# Patient Record
Sex: FEMALE | Race: WHITE | NOT HISPANIC OR LATINO | Employment: OTHER | URBAN - METROPOLITAN AREA
[De-identification: names, ages, dates, MRNs, and addresses within clinical notes are randomized per-mention and may not be internally consistent; named-entity substitution may affect disease eponyms.]

---

## 2023-10-16 ENCOUNTER — HOSPITAL ENCOUNTER (EMERGENCY)
Facility: CLINIC | Age: 88
Discharge: HOME | End: 2023-10-16
Attending: EMERGENCY MEDICINE | Admitting: EMERGENCY MEDICINE
Payer: MEDICARE

## 2023-10-16 ENCOUNTER — APPOINTMENT (OUTPATIENT)
Dept: CT IMAGING | Facility: CLINIC | Age: 88
End: 2023-10-16
Attending: EMERGENCY MEDICINE
Payer: MEDICARE

## 2023-10-16 VITALS
SYSTOLIC BLOOD PRESSURE: 153 MMHG | TEMPERATURE: 97.2 F | OXYGEN SATURATION: 98 % | RESPIRATION RATE: 16 BRPM | DIASTOLIC BLOOD PRESSURE: 75 MMHG | WEIGHT: 97 LBS | HEIGHT: 62 IN | BODY MASS INDEX: 17.85 KG/M2 | HEART RATE: 83 BPM

## 2023-10-16 DIAGNOSIS — S32.10XA CLOSED FRACTURE OF SACRUM, UNSPECIFIED PORTION OF SACRUM, INITIAL ENCOUNTER (H): ICD-10-CM

## 2023-10-16 DIAGNOSIS — S09.90XA CLOSED HEAD INJURY, INITIAL ENCOUNTER: ICD-10-CM
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
## 2023-10-16 DIAGNOSIS — S39.012A STRAIN OF LUMBAR REGION, INITIAL ENCOUNTER: ICD-10-CM

## 2023-10-16 DIAGNOSIS — S16.1XXA CERVICAL STRAIN, INITIAL ENCOUNTER: ICD-10-CM

## 2023-10-16 PROCEDURE — 99285 EMERGENCY DEPT VISIT HI MDM: CPT | Mod: 25

## 2023-10-16 PROCEDURE — 70450 CT HEAD/BRAIN W/O DYE: CPT | Mod: ME

## 2023-10-16 PROCEDURE — G1010 CDSM STANSON: HCPCS

## 2023-10-16 PROCEDURE — 72131 CT LUMBAR SPINE W/O DYE: CPT | Mod: ME

## 2023-10-16 PROCEDURE — 72192 CT PELVIS W/O DYE: CPT | Mod: MG

## 2023-10-16 PROCEDURE — 27197 CLSD TX PELVIC RING FX: CPT

## 2023-10-16 RX ORDER — OXYCODONE HYDROCHLORIDE 5 MG/1
5 TABLET ORAL EVERY 6 HOURS PRN
Qty: 12 TABLET | Refills: 0 | Status: SHIPPED | OUTPATIENT
Start: 2023-10-16 | End: 2023-10-16

## 2023-10-16 RX ORDER — OXYCODONE HYDROCHLORIDE 5 MG/1
5 TABLET ORAL EVERY 6 HOURS PRN
Qty: 12 TABLET | Refills: 0 | Status: SHIPPED | OUTPATIENT
Start: 2023-10-16 | End: 2024-04-22

## 2023-10-17 NOTE — ED NOTES
Road tested pt per md, road test went beautifully. Pt was able to get up and walk perfectly find with no stumbles. Pt did express some pain while walking but walked swiftly.

## 2023-10-17 NOTE — ED TRIAGE NOTES
Pt presents for complaint of a fall that occurred approx 2 hours ago. Pt states she was using her walker and attempted to turn when her shoes caught causing her to fall to the left side. Pt states she did hit her head but denies LOC. Denies use of blood thinning medications. Pt complains of neck pain, c-collar applied for precautions. Pt additionally complains of mid low back pain specifically between the sacrum and lumbar spine. ABC intact, A&Ox4.     Triage Assessment (Adult)       Row Name 10/16/23 2008          Triage Assessment    Airway WDL WDL        Respiratory WDL    Respiratory WDL WDL        Skin Circulation/Temperature WDL    Skin Circulation/Temperature WDL WDL        Cardiac WDL    Cardiac WDL WDL        Peripheral/Neurovascular WDL    Peripheral Neurovascular WDL WDL        Cognitive/Neuro/Behavioral WDL    Cognitive/Neuro/Behavioral WDL WDL

## 2023-10-17 NOTE — ED PROVIDER NOTES
History     Chief Complaint:  Fall       HPI   Michelle Thomas is a 89 year old female who presents with family from assisted living who had a mechanical fall while using her walker.  She was trying to turn and lost her footing and fell backwards onto her bottom and then onto her head.  She has pain in the back of her head neck low back and into her pelvis.  She notes she does have some chronic pelvis pain.  She is not anticoagulated and denies any recent illnesses.      Independent Historian:    Patient and family at the bedside    Review of External Notes:  Chart review    Medications:    oxyCODONE (ROXICODONE) 5 MG tablet  acetaminophen 500 MG CAPS  albuterol (PROAIR HFA, PROVENTIL HFA, VENTOLIN HFA) 108 (90 BASE) MCG/ACT inhaler  amLODIPine (NORVASC) 5 MG tablet  atorvastatin (LIPITOR) 40 MG tablet  cetirizine (ZYRTEC) 10 MG tablet  Cholecalciferol (VITAMIN D3 PO)  fluticasone (FLOVENT HFA) 110 MCG/ACT inhaler  gabapentin 8 % GEL  lidocaine (LIDODERM) 5 % patch  Losartan Potassium (COZAAR PO)  multivitamin, therapeutic with minerals (MULTI-VITAMIN) TABS  Omeprazole (PRILOSEC PO)        Past Medical History:    Past Medical History:   Diagnosis Date    Arthritis     Bladder cystocele     Complicated UTI (urinary tract infection)     CRD (chronic renal disease), stage III     Diffuse brain atrophy     H/O fall     Hiatal hernia     Hypertension     Irregular heart beat     Osteoporosis     Other chronic pain     Physical deconditioning     Pure hypercholesterolemia     Recurrent sinusitis     Renal disease     TIA (transient ischemic attack)     Uncomplicated asthma     Wedge compression fracture of T10 vertebra (H)        Past Surgical History:    Past Surgical History:   Procedure Laterality Date    BIOPSY      biopsy    BRONCHOSCOPY      COLONOSCOPY      EXTRACORPOREAL SHOCK WAVE LITHOTRIPSY (ESWL) Right 8/2/2016    Procedure: EXTRACORPOREAL SHOCK WAVE LITHOTRIPSY (ESWL);  Surgeon: Moody Dunaway,  "MD;  Location:  OR    EXTRACORPOREAL SHOCK WAVE LITHOTRIPSY, CYSTOSCOPY, INSERT STENT URETER(S), COMBINED Right 6/28/2016    Procedure: COMBINED EXTRACORPOREAL SHOCK WAVE LITHOTRIPSY, CYSTOSCOPY, INSERT STENT URETER(S);  Surgeon: Moody Dunaway MD;  Location:  OR    EYE SURGERY Bilateral     cataract surgery    GYN SURGERY      D and C    GYN SURGERY      Hysterectomy with bladder lift    OPEN REDUCTION INTERNAL FIXATION WRIST Right 6/14/2016    Procedure: OPEN REDUCTION INTERNAL FIXATION WRIST;  Surgeon: Judah Cortes MD;  Location:  OR          Physical Exam   Patient Vitals for the past 24 hrs:   BP Temp Pulse Resp SpO2 Height Weight   10/16/23 2200 (!) 153/75 -- 83 -- 98 % -- --   10/16/23 2145 (!) 149/77 -- 80 -- 98 % -- --   10/16/23 2130 (!) 167/74 -- 77 -- 97 % -- --   10/16/23 2007 (!) 148/59 97.2  F (36.2  C) 81 16 98 % 1.575 m (5' 2\") 44 kg (97 lb)        Physical Exam  GENERAL: well developed, pleasant  HEAD: atraumatic  EYES: pupils reactive, extraocular muscles intact, conjunctivae normal  ENT:  mucus membranes moist  NECK:  trachea midline, normal range of motion  RESPIRATORY: no tachypnea, breath sounds clear to auscultation   CVS: normal S1/S2, no murmurs, intact distal pulses  ABDOMEN: soft, nontender, nondistention  MUSCULOSKELETAL: Mild generalized neck pain no focal tenderness, low back pain as well as sacral pain able to take the hips through range of motion  SKIN: warm and dry, no acute rashes or ulceration  NEURO: GCS 15, cranial nerves intact, alert and oriented x3  PSYCH:  Mood/affect normal      Emergency Department Course       Imaging:  Lumbar spine CT w/o contrast   Final Result   IMPRESSION:   1.  No acute lumbar spine fracture.   2.  Severe right hydroureteronephrosis with numerous small renal calculi.      CT Pelvis Bone wo Contrast   Final Result   IMPRESSION: Acute to subacute minimally displaced fracture along the distal sacrum and sacrococcygeal junction.    "      Cervical spine CT w/o contrast   Final Result   IMPRESSION:   HEAD CT:   1.  No acute intracranial process.      CERVICAL SPINE CT:   1.  No acute cervical spine fracture.      Head CT w/o contrast   Final Result   IMPRESSION:   HEAD CT:   1.  No acute intracranial process.      CERVICAL SPINE CT:   1.  No acute cervical spine fracture.        Report per radiology    Laboratory:  Labs Ordered and Resulted from Time of ED Arrival to Time of ED Departure - No data to display     Procedures       Emergency Department Course & Assessments:             Interventions:  Medications - No data to display     Assessments:      Independent Interpretation (X-rays, CTs, rhythm strip):  None    Consultations/Discussion of Management or Tests:  None       Social Determinants of Health affecting care:  N/A     Disposition:  The patient was discharged to home.     Impression & Plan    CMS Diagnoses: None          Medical Decision Making:  Patient presents with mechanical fall.  Imaging is as above but acute versus subacute fracture of the lower sacrum coccyx region.  Patient been able to ambulate and was ambulated here and tolerated this well.  She feels comfortable going home.  She would like something for pain for at home besides Tylenol.  She has tolerated oxycodone in the past.    Critical Care time:  was 0 minutes for this patient excluding procedures.    Diagnosis:    ICD-10-CM    1. Fall, initial encounter  W19.XXXA       2. Closed head injury, initial encounter  S09.90XA       3. Cervical strain, initial encounter  S16.1XXA       4. Strain of lumbar region, initial encounter  S39.012A       5. Closed fracture of sacrum, unspecified portion of sacrum, initial encounter (H)  S32.10XA     acute vs chronic           Discharge Medications:  Discharge Medication List as of 10/16/2023 10:28 PM             Beny López MD  10/16/2023   No att. providers found              Beny López MD  10/16/23 0882

## 2024-04-22 ENCOUNTER — APPOINTMENT (OUTPATIENT)
Dept: CT IMAGING | Facility: CLINIC | Age: 89
DRG: 689 | End: 2024-04-22
Attending: PHYSICIAN ASSISTANT
Payer: MEDICARE

## 2024-04-22 ENCOUNTER — HOSPITAL ENCOUNTER (INPATIENT)
Facility: CLINIC | Age: 89
LOS: 2 days | Discharge: HOME HEALTH | DRG: 689 | End: 2024-04-25
Attending: PHYSICIAN ASSISTANT | Admitting: HOSPITALIST
Payer: MEDICARE

## 2024-04-22 DIAGNOSIS — E83.52 HYPERCALCEMIA: ICD-10-CM

## 2024-04-22 DIAGNOSIS — R91.8 INFILTRATE OF LUNG PRESENT ON IMAGING OF CHEST: ICD-10-CM

## 2024-04-22 DIAGNOSIS — G93.40 ACUTE ENCEPHALOPATHY: ICD-10-CM
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
## 2024-04-22 DIAGNOSIS — R29.6 UNWITNESSED FALL: ICD-10-CM

## 2024-04-22 LAB
ALBUMIN SERPL BCG-MCNC: 4.2 G/DL (ref 3.5–5.2)
ALBUMIN UR-MCNC: NEGATIVE MG/DL
ALP SERPL-CCNC: 160 U/L (ref 40–150)
ALT SERPL W P-5'-P-CCNC: 21 U/L (ref 0–50)
AMORPH CRY #/AREA URNS HPF: ABNORMAL /HPF
ANION GAP SERPL CALCULATED.3IONS-SCNC: 12 MMOL/L (ref 7–15)
APPEARANCE UR: ABNORMAL
AST SERPL W P-5'-P-CCNC: 26 U/L (ref 0–45)
ATRIAL RATE - MUSE: 76 BPM
BACTERIA #/AREA URNS HPF: ABNORMAL /HPF
BASOPHILS # BLD AUTO: 0 10E3/UL (ref 0–0.2)
BASOPHILS NFR BLD AUTO: 1 %
BILIRUB SERPL-MCNC: 0.6 MG/DL
BILIRUB UR QL STRIP: NEGATIVE
BUN SERPL-MCNC: 31.1 MG/DL (ref 8–23)
CA-I BLD-MCNC: 6.5 MG/DL (ref 4.4–5.2)
CALCIUM SERPL-MCNC: 13.3 MG/DL (ref 8.2–9.6)
CHLORIDE SERPL-SCNC: 105 MMOL/L (ref 98–107)
CK SERPL-CCNC: 61 U/L (ref 26–192)
COLOR UR AUTO: ABNORMAL
CREAT SERPL-MCNC: 1.24 MG/DL (ref 0.51–0.95)
DEPRECATED HCO3 PLAS-SCNC: 28 MMOL/L (ref 22–29)
DIASTOLIC BLOOD PRESSURE - MUSE: NORMAL MMHG
EGFRCR SERPLBLD CKD-EPI 2021: 39 ML/MIN/1.73M2
EOSINOPHIL # BLD AUTO: 0 10E3/UL (ref 0–0.7)
EOSINOPHIL NFR BLD AUTO: 1 %
ERYTHROCYTE [DISTWIDTH] IN BLOOD BY AUTOMATED COUNT: 14.1 % (ref 10–15)
GLUCOSE SERPL-MCNC: 104 MG/DL (ref 70–99)
GLUCOSE UR STRIP-MCNC: NEGATIVE MG/DL
HCT VFR BLD AUTO: 35.9 % (ref 35–47)
HGB BLD-MCNC: 11.9 G/DL (ref 11.7–15.7)
HGB UR QL STRIP: NEGATIVE
IMM GRANULOCYTES # BLD: 0 10E3/UL
IMM GRANULOCYTES NFR BLD: 0 %
INTERPRETATION ECG - MUSE: NORMAL
KETONES UR STRIP-MCNC: NEGATIVE MG/DL
LEUKOCYTE ESTERASE UR QL STRIP: NEGATIVE
LYMPHOCYTES # BLD AUTO: 0.8 10E3/UL (ref 0.8–5.3)
LYMPHOCYTES NFR BLD AUTO: 11 %
MCH RBC QN AUTO: 32.9 PG (ref 26.5–33)
MCHC RBC AUTO-ENTMCNC: 33.1 G/DL (ref 31.5–36.5)
MCV RBC AUTO: 99 FL (ref 78–100)
MONOCYTES # BLD AUTO: 0.7 10E3/UL (ref 0–1.3)
MONOCYTES NFR BLD AUTO: 9 %
NEUTROPHILS # BLD AUTO: 5.6 10E3/UL (ref 1.6–8.3)
NEUTROPHILS NFR BLD AUTO: 78 %
NITRATE UR QL: NEGATIVE
NRBC # BLD AUTO: 0 10E3/UL
NRBC BLD AUTO-RTO: 0 /100
P AXIS - MUSE: 72 DEGREES
PH UR STRIP: 7.5 [PH] (ref 5–7)
PLATELET # BLD AUTO: 241 10E3/UL (ref 150–450)
POTASSIUM SERPL-SCNC: 3.8 MMOL/L (ref 3.4–5.3)
PR INTERVAL - MUSE: 178 MS
PROCALCITONIN SERPL IA-MCNC: 0.13 NG/ML
PROT SERPL-MCNC: 7 G/DL (ref 6.4–8.3)
QRS DURATION - MUSE: 78 MS
QT - MUSE: 366 MS
QTC - MUSE: 411 MS
R AXIS - MUSE: 21 DEGREES
RBC # BLD AUTO: 3.62 10E6/UL (ref 3.8–5.2)
RBC URINE: 2 /HPF
SODIUM SERPL-SCNC: 145 MMOL/L (ref 135–145)
SP GR UR STRIP: 1.01 (ref 1–1.03)
SYSTOLIC BLOOD PRESSURE - MUSE: NORMAL MMHG
T AXIS - MUSE: 18 DEGREES
UROBILINOGEN UR STRIP-MCNC: NORMAL MG/DL
VENTRICULAR RATE- MUSE: 76 BPM
WBC # BLD AUTO: 7.2 10E3/UL (ref 4–11)
WBC URINE: 6 /HPF

## 2024-04-22 PROCEDURE — G0378 HOSPITAL OBSERVATION PER HR: HCPCS

## 2024-04-22 PROCEDURE — 93005 ELECTROCARDIOGRAM TRACING: CPT

## 2024-04-22 PROCEDURE — 99222 1ST HOSP IP/OBS MODERATE 55: CPT | Performed by: PHYSICIAN ASSISTANT

## 2024-04-22 PROCEDURE — 250N000011 HC RX IP 250 OP 636: Performed by: PHYSICIAN ASSISTANT

## 2024-04-22 PROCEDURE — 87086 URINE CULTURE/COLONY COUNT: CPT | Performed by: PHYSICIAN ASSISTANT

## 2024-04-22 PROCEDURE — 250N000013 HC RX MED GY IP 250 OP 250 PS 637: Performed by: PHYSICIAN ASSISTANT

## 2024-04-22 PROCEDURE — 258N000003 HC RX IP 258 OP 636: Performed by: PHYSICIAN ASSISTANT

## 2024-04-22 PROCEDURE — 84145 PROCALCITONIN (PCT): CPT | Performed by: PHYSICIAN ASSISTANT

## 2024-04-22 PROCEDURE — 80053 COMPREHEN METABOLIC PANEL: CPT | Performed by: PHYSICIAN ASSISTANT

## 2024-04-22 PROCEDURE — 99285 EMERGENCY DEPT VISIT HI MDM: CPT | Mod: 25

## 2024-04-22 PROCEDURE — 70450 CT HEAD/BRAIN W/O DYE: CPT | Mod: MA

## 2024-04-22 PROCEDURE — 81001 URINALYSIS AUTO W/SCOPE: CPT | Performed by: PHYSICIAN ASSISTANT

## 2024-04-22 PROCEDURE — 82330 ASSAY OF CALCIUM: CPT | Performed by: PHYSICIAN ASSISTANT

## 2024-04-22 PROCEDURE — 36415 COLL VENOUS BLD VENIPUNCTURE: CPT | Performed by: PHYSICIAN ASSISTANT

## 2024-04-22 PROCEDURE — 72131 CT LUMBAR SPINE W/O DYE: CPT | Mod: MA

## 2024-04-22 PROCEDURE — 71260 CT THORAX DX C+: CPT | Mod: MA

## 2024-04-22 PROCEDURE — 82550 ASSAY OF CK (CPK): CPT | Performed by: PHYSICIAN ASSISTANT

## 2024-04-22 PROCEDURE — 72125 CT NECK SPINE W/O DYE: CPT | Mod: MA

## 2024-04-22 PROCEDURE — 85025 COMPLETE CBC W/AUTO DIFF WBC: CPT | Performed by: PHYSICIAN ASSISTANT

## 2024-04-22 RX ORDER — PANTOPRAZOLE SODIUM 40 MG/1
40 TABLET, DELAYED RELEASE ORAL
Status: DISCONTINUED | OUTPATIENT
Start: 2024-04-23 | End: 2024-04-25 | Stop reason: HOSPADM

## 2024-04-22 RX ORDER — DILTIAZEM HYDROCHLORIDE 240 MG/1
240 CAPSULE, EXTENDED RELEASE ORAL DAILY
COMMUNITY

## 2024-04-22 RX ORDER — LIDOCAINE 40 MG/G
CREAM TOPICAL
Status: DISCONTINUED | OUTPATIENT
Start: 2024-04-22 | End: 2024-04-25 | Stop reason: HOSPADM

## 2024-04-22 RX ORDER — ONDANSETRON 2 MG/ML
4 INJECTION INTRAMUSCULAR; INTRAVENOUS EVERY 6 HOURS PRN
Status: DISCONTINUED | OUTPATIENT
Start: 2024-04-22 | End: 2024-04-25 | Stop reason: HOSPADM

## 2024-04-22 RX ORDER — ONDANSETRON 4 MG/1
4 TABLET, ORALLY DISINTEGRATING ORAL EVERY 6 HOURS PRN
Status: DISCONTINUED | OUTPATIENT
Start: 2024-04-22 | End: 2024-04-25 | Stop reason: HOSPADM

## 2024-04-22 RX ORDER — DILTIAZEM HYDROCHLORIDE 240 MG/1
240 CAPSULE, COATED, EXTENDED RELEASE ORAL DAILY
Status: DISCONTINUED | OUTPATIENT
Start: 2024-04-22 | End: 2024-04-25 | Stop reason: HOSPADM

## 2024-04-22 RX ORDER — ACETAMINOPHEN 500 MG
1000 TABLET ORAL 3 TIMES DAILY
Status: DISCONTINUED | OUTPATIENT
Start: 2024-04-22 | End: 2024-04-25 | Stop reason: HOSPADM

## 2024-04-22 RX ORDER — OMEPRAZOLE 10 MG/1
10 CAPSULE, DELAYED RELEASE ORAL DAILY
COMMUNITY

## 2024-04-22 RX ORDER — AMOXICILLIN 250 MG
2 CAPSULE ORAL 2 TIMES DAILY PRN
Status: DISCONTINUED | OUTPATIENT
Start: 2024-04-22 | End: 2024-04-25 | Stop reason: HOSPADM

## 2024-04-22 RX ORDER — IOPAMIDOL 755 MG/ML
50 INJECTION, SOLUTION INTRAVASCULAR ONCE
Status: COMPLETED | OUTPATIENT
Start: 2024-04-22 | End: 2024-04-22

## 2024-04-22 RX ORDER — SODIUM CHLORIDE 9 MG/ML
INJECTION, SOLUTION INTRAVENOUS CONTINUOUS
Status: ACTIVE | OUTPATIENT
Start: 2024-04-22 | End: 2024-04-23

## 2024-04-22 RX ORDER — ACETAMINOPHEN 500 MG
1000 TABLET ORAL 3 TIMES DAILY
COMMUNITY

## 2024-04-22 RX ORDER — AMOXICILLIN 250 MG
1 CAPSULE ORAL 2 TIMES DAILY PRN
Status: DISCONTINUED | OUTPATIENT
Start: 2024-04-22 | End: 2024-04-25 | Stop reason: HOSPADM

## 2024-04-22 RX ORDER — POLYETHYLENE GLYCOL 3350 17 G/17G
17 POWDER, FOR SOLUTION ORAL 2 TIMES DAILY PRN
Status: DISCONTINUED | OUTPATIENT
Start: 2024-04-22 | End: 2024-04-25 | Stop reason: HOSPADM

## 2024-04-22 RX ADMIN — SODIUM CHLORIDE 1000 ML: 9 INJECTION, SOLUTION INTRAVENOUS at 11:17

## 2024-04-22 RX ADMIN — SODIUM CHLORIDE: 9 INJECTION, SOLUTION INTRAVENOUS at 19:16

## 2024-04-22 RX ADMIN — ACETAMINOPHEN 1000 MG: 500 TABLET, FILM COATED ORAL at 19:16

## 2024-04-22 RX ADMIN — IOPAMIDOL 50 ML: 755 INJECTION, SOLUTION INTRAVENOUS at 12:33

## 2024-04-22 RX ADMIN — DILTIAZEM HYDROCHLORIDE 240 MG: 240 CAPSULE, COATED, EXTENDED RELEASE ORAL at 18:14

## 2024-04-22 ASSESSMENT — ACTIVITIES OF DAILY LIVING (ADL)
ADLS_ACUITY_SCORE: 42
ADLS_ACUITY_SCORE: 36
DEPENDENT_IADLS:: CLEANING;COOKING;MEAL PREPARATION;MEDICATION MANAGEMENT
ADLS_ACUITY_SCORE: 42
ADLS_ACUITY_SCORE: 36
ADLS_ACUITY_SCORE: 42
ADLS_ACUITY_SCORE: 36
ADLS_ACUITY_SCORE: 42
ADLS_ACUITY_SCORE: 36

## 2024-04-22 ASSESSMENT — COLUMBIA-SUICIDE SEVERITY RATING SCALE - C-SSRS
2. HAVE YOU ACTUALLY HAD ANY THOUGHTS OF KILLING YOURSELF IN THE PAST MONTH?: NO
1. IN THE PAST MONTH, HAVE YOU WISHED YOU WERE DEAD OR WISHED YOU COULD GO TO SLEEP AND NOT WAKE UP?: NO
6. HAVE YOU EVER DONE ANYTHING, STARTED TO DO ANYTHING, OR PREPARED TO DO ANYTHING TO END YOUR LIFE?: NO

## 2024-04-22 NOTE — ED NOTES
Bed: ED40  Expected date: 4/22/24  Expected time: 10:32 AM  Means of arrival:   Comments:  Bhaskar Franco

## 2024-04-22 NOTE — ED NOTES
[facility]  ED Nurse Handoff Report    ED Chief complaint: Fall  . ED Diagnosis:   Final diagnoses:   None       Allergies:   Allergies   Allergen Reactions    Dust Mites     Latex Itching    Pollen Extract     Ampicillin Rash    Cefzil [Cefprozil] Rash     Red, itchy  Note: received Ancef 10/6/09 w/o problems    Doxycycline Nausea    Hydrocodone Other (See Comments)     Confusion    Oxycodone Itching      Not a true allergy    Penicillins Rash    Septra [Sulfamethoxazole-Trimethoprim] Rash       Code Status: DNR    Activity level - Baseline/Home:  walker.  Activity Level - Current:   assist of 1.   Lift room needed: No.   Bariatric: No   Needed: No   Isolation: No.   Infection: Not Applicable.     Respiratory status: Room air    Vital Signs (within 30 minutes):   Vitals:    04/22/24 1200 04/22/24 1245 04/22/24 1300 04/22/24 1315   BP: (!) 186/92 (!) 177/86 (!) 183/103    Pulse: 77 89 87 83   Resp: 11 14 15 10   Temp:       TempSrc:       SpO2: 99%   94%       Cardiac Rhythm:  ,      Pain level:    Patient confused: Yes.   Patient Falls Risk: patient and family education and activity supervised.   Elimination Status: Has voided     Patient Report - Initial Complaint: fall.   Focused Assessment: Michelle Thomas is a 89 year old female with history of osteoporosis, osteoarthritis, sciatica, hypertension, hyperlipidemia, and chronic kidney disease who presents to the ER from her nursing home via EMS after a fall. The nursing home staff reports the patient had a fall yesterday where EMS responded. The patient didn't go to the hospital though because she stated that she felt fine. This morning the nursing home staff reports that they found the patient on the floor when they were about to give the patient her morning medications. At the time the patient states that she was trying to put her pants but lost her balance and fell. Upon arrival to the ER she endorses lower back pain, a  headache, and abdominal pain. She also told EMS that she felt a bump on her posterior head, but EMS couldn't palpate one.        Abnormal Results:   Labs Ordered and Resulted from Time of ED Arrival to Time of ED Departure   COMPREHENSIVE METABOLIC PANEL - Abnormal       Result Value    Sodium 145      Potassium 3.8      Carbon Dioxide (CO2) 28      Anion Gap 12      Urea Nitrogen 31.1 (*)     Creatinine 1.24 (*)     GFR Estimate 39 (*)     Calcium 13.3 (*)     Chloride 105      Glucose 104 (*)     Alkaline Phosphatase 160 (*)     AST 26      ALT 21      Protein Total 7.0      Albumin 4.2      Bilirubin Total 0.6     ROUTINE UA WITH MICROSCOPIC REFLEX TO CULTURE - Abnormal    Color Urine Straw      Appearance Urine Cloudy (*)     Glucose Urine Negative      Bilirubin Urine Negative      Ketones Urine Negative      Specific Gravity Urine 1.009      Blood Urine Negative      pH Urine 7.5 (*)     Protein Albumin Urine Negative      Urobilinogen Urine Normal      Nitrite Urine Negative      Leukocyte Esterase Urine Negative      Bacteria Urine Few (*)     Amorphous Crystals Urine Few (*)     RBC Urine 2      WBC Urine 6 (*)    CBC WITH PLATELETS AND DIFFERENTIAL - Abnormal    WBC Count 7.2      RBC Count 3.62 (*)     Hemoglobin 11.9      Hematocrit 35.9      MCV 99      MCH 32.9      MCHC 33.1      RDW 14.1      Platelet Count 241      % Neutrophils 78      % Lymphocytes 11      % Monocytes 9      % Eosinophils 1      % Basophils 1      % Immature Granulocytes 0      NRBCs per 100 WBC 0      Absolute Neutrophils 5.6      Absolute Lymphocytes 0.8      Absolute Monocytes 0.7      Absolute Eosinophils 0.0      Absolute Basophils 0.0      Absolute Immature Granulocytes 0.0      Absolute NRBCs 0.0     CK TOTAL - Normal    CK 61     IONIZED CALCIUM   URINE CULTURE        CT Chest/Abdomen/Pelvis w Contrast   Final Result   IMPRESSION:   1.  Age-indeterminate fracture of T8 may be chronic however no   comparison imaging of the  thoracic spine is available. Please   correlate for tenderness in this area and if clinically indicated, a   follow-up thoracic spine MRI can be considered.   2.  Otherwise, no definite acute traumatic injury in the chest,   abdomen and pelvis.   3.  Mild groundglass opacities in the right lower lobe may be   infectious or inflammatory.   4.  Mild gallbladder distention and moderate intrahepatic and extra   hepatic biliary ductal dilatation has progressed since 2016. No   obstructing calculi or masses by CT. Correlate with liver function   tests and outpatient MRCP or ERCP could be considered.   5.  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 A 6 mm   obstructing calculus in the mid right ureter previously measured 12   mm.    6.  Other incidental findings as discussed above.      SIMI SNIDER MD            SYSTEM ID:  [id number]      CT Cervical Spine w/o Contrast   Preliminary Result   IMPRESSION:   1. No acute cervical spine fracture.   2. Anterolisthesis of C4 on C5 and C5 on C6, unchanged.   3. Multilevel degenerative changes, as described.      Head CT w/o contrast   Preliminary Result   IMPRESSION:   1. No CT findings of acute intracranial process.   2. Brain atrophy and presumed chronic small vessel ischemic changes,   as described.      CT Lumbar Spine w/o Contrast    (Results Pending)       Treatments provided: see MAR  Family Comments: at bedside  OBS brochure/video discussed/provided to patient:  Yes  ED Medications:   Medications   sodium chloride 0.9% BOLUS 1,000 mL (1,000 mLs Intravenous $New Bag 4/22/24 1117)   iopamidol (ISOVUE-370) solution 50 mL (50 mLs Intravenous $Given 4/22/24 1233)   sodium chloride (PF) 0.9% PF flush 52 mL (52 mLs Intravenous $Given 4/22/24 1233)       Drips infusing:  No  For the majority of the shift this patient was Green.   Interventions performed were N/A.    Sepsis treatment initiated: No    Cares/treatment/interventions/medications to be completed following  ED care: see inpatient admit orders.     ED Nurse Name: Michelle Robles RN  1:16 PM

## 2024-04-22 NOTE — H&P
[facility]    History and Physical - Hospitalist Service       Date of Admission:  4/22/2024    Assessment & Plan      Michelle Thomas is a 89 year old female with PMHx significant for HTN, hx of SVT, HLP, CKD, asthma, osteoporosis and fairly recent T8 compression fx, who was admitted on 4/22/2024 with acute encephalopathy. She has had 2 falls in the past 2 days.    1. Acute encephalopathy  Family notes increased confusion from baseline since Friday, 4/19. No focal neurological deficits.   Etiology unclear, no obvious infection noted. UA shows negative LE, negative nitrites with 6 WBCs and bacteria. She does report dysuria.   CT abd/pelvis does not show anything distinctly acute, mild ground glass opacity in RLL, mild gallbladder distention with mod intrahepatic and extra hepatic biliary ductal dilation, atrophic R kidney due to long standing obstruction.    CT head, CT cervical and lumbar spine negative for acute process or fx.  CMP stable, Cr likely at baseline at 1.24 (1.0-1.2), BUN mildly elevated at 31, alk phos 160, LFTs otherwise negative. Ca elevated at 13.3, ionized Ca 6.5. Procalcitonin 0.13. CBC unremarkable.   Family does report pt had a poor night of sleep 2 nights ago, report poor po intake at baseline and pt reports constipation. Family reports that she complained of diarrhea on Saturday.  Suspect encephalopathy is multifactorial. Pt endorses dysuria but UA does not support UTI. No abdominal pain, nausea or vomiting. No abd tenderness on exam, low suspicion for biliary obstruction. Sleep deprivation and constipation could contribute. Ca is elevated, pt was recently started on Tymlos injections for osteoporosis.  Opacity noted on CXR, denies cough or SOB, she is not hypoxic or tachycardic to support respiratory infection.   - admit to OBS  - will given gentle IVFs overnight  - hold Tymlos and Ca supplement  - follow urine culture, low threshold to treat for UTI  - PT/ SW  consult  - monitor stool output  - bladder is distended on CT. Monitor PVRs    2. Hypercalcemia  Chronic T8 compression fx  Ca 13.3 with ionized Ca 6.5. pt recently started on daily Tymlos injections for osteoporosis and to help T8 compression fx heal. Also on Vitamin C and D supplementation  - hold supplementation and Tymlos  - asked family to reach out to endocrinologist to inform then of labs and further recommendations.   - continue TID Tylenol   3. HTN  Hx of SVT  - resume home Diltiazem  4. HLP  - hold statin while OBS  5. CKD  Cr 1.24 here, likely his baseline. Baseline appears to fluctuate between 1.0-1.2  6. Asthma    Managed with PRN albuterol inhaler         Diet:  regular diet  DVT Prophylaxis: Pneumatic Compression Devices  Portillo Catheter: Not present  Lines: None     Cardiac Monitoring: None  Code Status:  Full code. Discussed with pt as she was previously DNR. She is adamant that she wants to be full code to make it to 100. Explained likely poor outcome if pt was resuscitated but she stood firm in her wishes. Family would like to honor her wishes.    Clinically Significant Risk Factors Present on Admission           # Hypercalcemia: Highest Ca = 13.3 mg/dL in last 2 days, will monitor as appropriate                        Disposition Plan     Medically Ready for Discharge: Anticipated Tomorrow         The patient's care was discussed with the Bedside Nurse, Patient, Patient's Family, and ED provider, Blaise Qiu PA-c .    Leidy Morales PA-C  Hospitalist Service  [facility]  Securely message with [employer] (more info)  Text page via [facility] Paging/Directory     ______________________________________________________________________    Chief Complaint   Falls and confusion    History is obtained from the patient and patient's daughter     History of Present Illness   Michelle Thomas is a 89 year old female with PMHx significant for HTN, hx of SVT, HLP, CKD, asthma, osteoporosis  and fairly recent T8 compression fx, who presents to the ED after multiple falls.  Per staff at her nursing home, she had a fall yesterday but felt well so did not go to the hospital.  She fell again this morning while try to put on her pants.  She again denies any injury.  The family does note increased confusion since Friday.  The patient does not endorse dysuria and constipation.  The patient's family states that she complained of diarrhea on Saturday.  She otherwise has been feeling well lately.  The family notes that she has poor oral intake at baseline.  Patient denies fever, chills, chest pain, shortness of breath, Sam pain, nausea, or vomiting.  She does have a history of fairly recent T8 compression fracture and she was seen by endocrinology and started on Tymlos injections daily to help her fracture heal.  She is also on a calcium and vitamin D supplement.  She was treated for a UTI at the end of March and was treated with prednisone for sinus congestion in early April.  The daughter states her mental status remained normal while taking these medications.  The family does report that she had a poor night of sleep 2 nights ago.      Past Medical History    Past Medical History:   Diagnosis Date    Arthritis     Back and right knee    Bladder cystocele     Complicated UTI (urinary tract infection)     CRD (chronic renal disease), stage III (H)     Diffuse brain atrophy     H/O fall     Hiatal hernia     Hypertension     Irregular heart beat     Osteoporosis     Other chronic pain     neck and back    Physical deconditioning     Pure hypercholesterolemia     Recurrent sinusitis     Renal disease     Kidney stone    TIA (transient ischemic attack)     Uncomplicated asthma     Wedge compression fracture of T10 vertebra (H)        Past Surgical History   Past Surgical History:   Procedure Laterality Date    BIOPSY      biopsy    BRONCHOSCOPY      COLONOSCOPY      EXTRACORPOREAL SHOCK WAVE LITHOTRIPSY (ESWL)  Right 8/2/2016    Procedure: EXTRACORPOREAL SHOCK WAVE LITHOTRIPSY (ESWL);  Surgeon: Moody Dunaway MD;  Location: [facility] OR    EXTRACORPOREAL SHOCK WAVE LITHOTRIPSY, CYSTOSCOPY, INSERT STENT URETER(S), COMBINED Right 6/28/2016    Procedure: COMBINED EXTRACORPOREAL SHOCK WAVE LITHOTRIPSY, CYSTOSCOPY, INSERT STENT URETER(S);  Surgeon: Moody Dunaway MD;  Location:  OR    EYE SURGERY Bilateral     cataract surgery    GYN SURGERY      D and C    GYN SURGERY      Hysterectomy with bladder lift    OPEN REDUCTION INTERNAL FIXATION WRIST Right 6/14/2016    Procedure: OPEN REDUCTION INTERNAL FIXATION WRIST;  Surgeon: Judah Cortes MD;  Location:  OR       Prior to Admission Medications   Prior to Admission Medications   Prescriptions Last Dose Informant Patient Reported? Taking?   Abaloparatide 3120 MCG/1.56ML SOPN injection 4/21/2024 at HS  Yes Yes   Sig: Inject 80 mcg Subcutaneous at bedtime (80 mcg dose pen injector)   Cholecalciferol (VITAMIN D3 PO) 4/19/2024 at AM  Yes Yes   Sig: Take 2,000 Units by mouth daily    acetaminophen (TYLENOL) 500 MG tablet Unknown  Yes Yes   Sig: Take 1,000 mg by mouth 3 times daily   albuterol (PROAIR HFA, PROVENTIL HFA, VENTOLIN HFA) 108 (90 BASE) MCG/ACT inhaler More than a year  Yes Yes   Sig: Inhale 2 puffs into the lungs every 4 hours as needed Gets refilled once a year, never uses.   atorvastatin (LIPITOR) 40 MG tablet 4/21/2024 at HS  Yes Yes   Sig: Take 40 mg by mouth At Bedtime   calcium carbonate (OS-DEANA) 1500 (600 Ca) MG tablet 4/20/2024 at AM  Yes Yes   Sig: Take 600 mg by mouth daily   diltiazem ER (DILT-XR) 240 MG 24 hr ER beaded capsule Un4/22 Not known if patient took this morning or not. Prior dose was on 4/20/24. known at AM  Yes Yes   Sig: Take 240 mg by mouth daily   multivitamin, therapeutic with minerals (MULTI-VITAMIN) TABS 4/19/2024 at AM  Yes Yes   Sig: Take 1 tablet by mouth daily   omeprazole (PRILOSEC) 10 MG DR capsule 4/20/2024 at AM  Yes  Yes   Sig: Take 10 mg by mouth daily      Facility-Administered Medications: None           Physical Exam   Vital Signs: Temp: 97.4  F (36.3  C) Temp src: Temporal BP: (!) 183/103 Pulse: 83   Resp: 10 SpO2: 94 % O2 Device: None (Room air)    Weight: 0 lbs 0 oz    GENERAL:  Comfortable.  PSYCH: pleasant, oriented, No acute distress.  HEENT:  Atraumatic, normocephalic. Normal conjunctiva, normal hearing, and oropharynx is normal.  NECK:  Supple, no neck vein distention  HEART:  Normal S1, S2 with no murmur, no pericardial rub, gallops or S3 or S4.  LUNGS:  Clear to auscultation, normal Respiratory effort. No wheezing, rales or ronchi.  GI:  Soft, normal bowel sounds. Non-tender, non distended.   EXTREMITIES:  No pedal edema, +2 pulses bilateral and equal.  SKIN:  Dry to touch, No rash, wound or ulcerations.  NEUROLOGIC:  CN 2-12 intact, BL 5/5 symmetric upper and lower extremity strength, sensation is intact with no focal deficits.      Medical Decision Making       65 MINUTES SPENT BY ME on the date of service doing chart review, history, exam, documentation & further activities per the note.      Data     I have personally reviewed the following data over the past 24 hrs:    7.2  \   11.9   / 241     145 105 31.1 (H) /  104 (H)   3.8 28 1.24 (H) \     ALT: 21 AST: 26 AP: 160 (H) TBILI: 0.6   ALB: 4.2 TOT PROTEIN: 7.0 LIPASE: N/A     Procal: 0.13 CRP: N/A Lactic Acid: N/A         Imaging results reviewed over the past 24 hrs:   Recent Results (from the past 24 hour(s))   Head CT w/o contrast    Narrative    CT SCAN OF THE HEAD WITHOUT CONTRAST April 22, 2024 12:44 PM     HISTORY: Fall, trauma.    TECHNIQUE: Axial images of the head and coronal reformations without  IV contrast material. Radiation dose for this scan was reduced using  automated exposure control, adjustment of the mA and/or kV according  to patient size, or iterative reconstruction technique.    COMPARISON: 10/16/2023.    FINDINGS: There is no evidence  of intracranial hemorrhage, mass, acute  infarct or anomaly. The ventricles are normal in size, shape and  configuration. Mild diffuse parenchymal volume loss. Mild patchy  periventricular white matter hypodensities which are nonspecific, but  likely related to chronic microvascular ischemic disease.  Atherosclerotic calcification of the carotid siphons.    Bilateral lens implants. Mild to moderate polypoid mucosal thickening  in the left sphenoid sinus with minimal scattered paranasal sinus  mucosal thickening elsewhere. The mastoid and middle ear cavities are  free of significant disease. The bony calvarium and bones of the skull  base appear intact.       Impression    IMPRESSION:  1. No CT findings of acute intracranial process.  2. Brain atrophy and presumed chronic small vessel ischemic changes,  as described.    RICHARD ROGEL MD         SYSTEM ID:  [id number]   CT Cervical Spine w/o Contrast    Narrative    CT CERVICAL SPINE WITHOUT CONTRAST April 22, 2024 12:44 PM     HISTORY: Fall, trauma.     TECHNIQUE: Axial images of the cervical spine were obtained without  intravenous contrast. Multiplanar reformations were performed.  Radiation dose for this scan was reduced using automated exposure  control, adjustment of the mA and/or kV according to patient size, or  iterative reconstruction technique.    COMPARISON: 10/16/2023.    FINDINGS: No acute cervical spine fracture is identified.  Anterolisthesis of C4 on C5 and C5 on C6 measuring up to approximately  3 mm. Minimal levoconvex curvature of the cervicothoracic junction.  Moderate to severe disc height loss at C5-C6. At least moderate disc  height loss C6-C7, with milder degrees of disc height loss elsewhere.  Scattered marginal endplate and uncovertebral spurs. There is solid  ankylosis across the right C5-C6 facet joint with multilevel  degenerative facet disease. Facet arthropathy appears moderate to  severe on the left at C2-C3, C3-C4 and C4-C5 and on the  right at  C4-C5. Degenerative changes of the medial atlantoaxial joint.  Multilevel disc osteophyte complexes. No definite high-grade central  spinal canal stenosis identified. Mild/moderate degrees of  degenerative neural foraminal stenosis are noted.    Couple of subcentimeter hypodensities in the thyroid gland. Mild  scarring of the lung apices. Mild atherosclerotic calcification of the  carotid bifurcations.      Impression    IMPRESSION:  1. No acute cervical spine fracture.  2. Anterolisthesis of C4 on C5 and C5 on C6, unchanged.  3. Multilevel degenerative changes, as described.    RICHARD ROGEL MD         SYSTEM ID:  [id number]   CT Lumbar Spine w/o Contrast    Narrative    CT LUMBAR SPINE WITHOUT CONTRAST April 22, 2024 12:45 PM     HISTORY: Fall, trauma.      TECHNIQUE: Axial images of the lumbar spine were obtained without  intravenous contrast. Multiplanar reformations were performed.  Radiation dose for this scan was reduced using automated exposure  control, adjustment of the mA and/or kV according to patient size, or  iterative reconstruction technique.     COMPARISON: CT lumbar spine dated 10/16/2023. CT chest, abdomen and  pelvis 4/22/2024.     FINDINGS: Nomenclature is based on five lumbar vertebral bodies. There  is diffuse osseous demineralization, limiting evaluation for fracture.  No definite acute lumbar spine fracture is identified. Vertebral body  heights appear within normal limits. Minimal anterolisthesis of L3 on  L4 and retrolisthesis of L4 on L5. Minimal anterolisthesis of L5 on  S1. There is mild left lateral listhesis of T12 on L1 and L1 on L2 and  mild to moderate right lateral listhesis of L3 on L4. Moderate  dextroconvex curvature centered at L2-L3 measuring approximately 34-35  degrees from the superior endplate of L1 to the inferior endplate of  L4.    Ankylosis across the L3-L4 disc space is noted. There is moderate to  severe disc space narrowing at L2-L3 and L4-L5. Lesser  degrees of disc  space narrowing elsewhere. Scattered marginal endplate osteophytes.  Multilevel degenerative facet disease. Disc bulges with posterior  endplate osteophytic ridging. There appears to be at least moderate  central spinal canal stenosis at L3-L4 and relatively lesser degrees  of mild/mild to moderate spinal canal narrowing elsewhere. There is up  to moderate neural foraminal stenosis on the left at L3-L4 and  mild/moderate neural foraminal stenosis on the right at L4-L5. Lesser  degrees of neural foraminal narrowing seen elsewhere.    Mild presumed atelectasis versus scarring in the lung bases. Partially  visualized hiatal hernia. Scattered atherosclerotic calcifications of  the aortoiliac arteries. Please see separate report from CT chest and  CT abdomen and pelvis of same date for additional details regarding  extraspinal findings.      Impression    IMPRESSION:  1. No definite acute lumbar spine fracture.  2. Multilevel degenerative changes, as described.  3. Moderate dextroconvex curvature of the lumbar spine with mild/mild  to moderate multilevel degenerative spondylolisthesis.   CT Chest/Abdomen/Pelvis w Contrast    Narrative    CT CHEST/ABDOMEN/PELVIS W CONTRAST 4/22/2024 12:45 PM    CLINICAL HISTORY: Trauma, abdominal pain, fall    TECHNIQUE: CT scan of the chest, abdomen, and pelvis was performed  following injection of IV contrast. Multiplanar reformats were  obtained. Dose reduction techniques were used.   CONTRAST: 50mL [id number]    COMPARISON: 10/16/2023, 7/13/2016    FINDINGS:   LUNGS AND PLEURA: No pneumothorax, consolidation or pleural effusion.  Mild groundglass opacities in the right lower lobe (series 4, image  [id number]), likely inflammatory. No significant pulmonary nodules or  masses. Few punctate calcified granuloma.    MEDIASTINUM/AXILLAE: No lymphadenopathy. No filling defects in the  main or lobar pulmonary arteries. No thoracic aortic aortic injury or  aneurysm. Moderate  coronary artery calcifications. Small pericardial  effusion. Large hiatal hernia containing the upper half of the  stomach.    HEPATOBILIARY: No traumatic injury in the liver. No calcified  gallstones. Moderate dilatation of the extrahepatic bile ducts 13 mm  has progressed since 2016. Tapering of the duct towards the ampulla.  Mild intrahepatic biliary ductal dictation. Distended gallbladder,  new.    PANCREAS: Diffuse mild pancreatic parenchymal atrophy. No pancreatic  ductal dilatation or traumatic injury. Small cyst in the pancreatic  neck/body measuring 5 mm (3/161).    SPLEEN: Normal.    ADRENAL GLANDS: Normal.    KIDNEYS/BLADDER: No traumatic injury. Atrophic right kidney with an  obstructing calculus in the right mid ureter measuring 6 mm (series 3,  image 204), previously measuring 12 mm in 2016. Multiple other  nonobstructing calculi in the right kidney and upper ureter. Simple  cyst in the left kidney requiring specific follow-up, stable. No left  renal calculi or hydronephrosis. Distended urinary bladder.    BOWEL: No small bowel or colonic obstruction or inflammatory changes.  Colonic diverticulosis.    PELVIC ORGANS: Hysterectomy    ADDITIONAL FINDINGS: No lymphadenopathy. No free fluid or fluid  collections. No free air.    MUSCULOSKELETAL: Degenerative changes of the right shoulder.  Thoracolumbar scoliosis. Degenerative changes in the thoracolumbar  spine. Sacral Tarlov cysts. Wedge compression fracture T8 is age  indeterminate, not previously included on the available comparisons.  Wedge compression fracture of T10 is stable since 2016.      Impression    IMPRESSION:  1.  Age-indeterminate fracture of T8 may be chronic however no  comparison imaging of the thoracic spine is available. Please  correlate for tenderness in this area and if clinically indicated, a  follow-up thoracic spine MRI can be considered.  2.  Otherwise, no definite acute traumatic injury in the chest,  abdomen and pelvis.  3.   Mild groundglass opacities in the right lower lobe may be  infectious or inflammatory.  4.  Mild gallbladder distention and moderate intrahepatic and extra  hepatic biliary ductal dilatation has progressed since 2016. No  obstructing calculi or masses by CT. Correlate with liver function  tests and outpatient MRCP or ERCP could be considered.  5.  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 A 6 mm  obstructing calculus in the mid right ureter previously measured 12  mm.   6.  Other incidental findings as discussed above.    SIMI SNIDER MD         SYSTEM ID:  [id number]

## 2024-04-22 NOTE — ED PROVIDER NOTES
History     Chief Complaint:  Fall       HPI   Michelle Thomas is a 89 year old female with history of osteoporosis, osteoarthritis, sciatica, hypertension, hyperlipidemia, and chronic kidney disease who presents to the ER from her nursing home via EMS after a fall. The nursing home staff reports the patient had a fall yesterday where EMS responded. The patient didn't go to the hospital though because she stated that she felt fine. This morning the nursing home staff reports that they found the patient on the floor when they were about to give the patient her morning medications. At the time the patient states that she was trying to put her pants but lost her balance and fell. Upon arrival to the ER she endorses lower back pain, a headache, and abdominal pain. She also told EMS that she felt a bump on her posterior head, but EMS couldn't palpate one.  Denies chest pain shortness of breath or palpitations.  No cough, fever, dysuria, or hematuria.  No vomiting or diarrhea.  No rashes.    Independent Historian:   EMS - They report the history above as related to them by the nursing home.  Daughter - She reports some portion of the history as noted above.  Daughter states she is more confused than baseline and has been intermittently repeating the words thankful.  Review of External Notes:   I reviewed Dr. Allan Muro internal medicine/diabetes note on 4/5/2024 where the patient was seen and noted to have a T8 fracture and was started on medications to help with Osteopenia.      Medications:    Prednisone  Albuterol  Omeprazole  Flovent  Lipitor  Diltiazem       Past Medical History:    Asthma  Diffuse cystic mastopathy  Chronic sinusitis  Hypercholesterolemia  Hydronephrosis of kidney  Sciatica  Osteoarthritis  Chronic kidney disease  Hypertension  Bladder cystocele  Osteoporosis  Ganglion cyst  Depression  Sciatica  Fibrocystic breast changes  Kidney stones  Prolapsed uterus     Past Surgical History:    Breast  biopsy  Cataract removal  Colonoscopy  Bronchoscopy  Vaginal hysterectomy  Lithotripsy   Wrist reduction internal fixation     Physical Exam   Patient Vitals for the past 24 hrs:   BP Temp Temp src Pulse Resp SpO2   04/22/24 1717 (!) 184/97 -- -- -- -- --   04/22/24 1515 (!) 172/84 -- -- 86 -- --   04/22/24 1500 (!) 189/91 -- -- 86 (!) 9 --   04/22/24 1445 (!) 198/92 -- -- 85 18 --   04/22/24 1430 (!) 186/94 -- -- 84 12 --   04/22/24 1415 (!) 186/98 -- -- 85 (!) 9 96 %   04/22/24 1400 (!) 185/93 -- -- 84 14 --   04/22/24 1345 (!) 179/87 -- -- 81 15 95 %   04/22/24 1330 (!) 172/86 -- -- 84 11 96 %   04/22/24 1315 (!) 180/93 -- -- 83 10 94 %   04/22/24 1300 (!) 183/103 -- -- 87 15 --   04/22/24 1245 (!) 177/86 -- -- 89 14 --   04/22/24 1200 (!) 186/92 -- -- 77 11 99 %   04/22/24 1145 (!) 187/87 -- -- 79 15 93 %   04/22/24 1130 (!) 184/87 -- -- 76 12 97 %   04/22/24 1100 (!) 182/90 -- -- 79 12 97 %   04/22/24 1056 (!) 184/90 97.4  F (36.3  C) Temporal 78 17 96 %        Physical Exam  General: Alert and awake.   Head:  Scalp is NC/AT without bruising, hematomas.  Eyes:  Globes normal and atraumatic.  PERRL, EOMI   ENT:  No bruising of facial bone ttp or step-offs.    TM's normal bilaterally    Oropharynx atraumatic.  Neck:  In C-collar.  Normal range of motion without rigidity. Able to rotate 45 degrees BL. No bruising or swelling.  CV:  Regular rate and rhythm. No M/R/G.  Resp:  Breath sounds are clear bilaterally. Normal effort.  Abdomen: Abdomen is soft, no distension, diffuse mild tenderness, no masses. No fccal RUQ ttp.  MS:  Midline lower lumbar spine ttp, no stepoffs.  No midline cervical, thoracic,  tenderness    No tenderness over sternum, scapula, ribs, clavicles.    Extremities atraumatic.  PROM of all other major joints performed and unremarkable.  Skin:  Warm and dry, No bruising.  Extremities warm and well perfused  Neuro:  Alert and oriented to self and place, not month.  Strength and sensation grossly  intact in all 4 extremities.  No slurred speech.  Cranial nerves 2-12 intact. GCS: 15  Psych:  Alert.  Normal affect.  Cooperative.      Emergency Department Course   ECG  ECG taken at 1119, ECG read at 1127  Sinus rhythm with premature atrial complexes  Low voltage QRS   No significant change as compared to prior, dated 8/2/16.  Rate 76 bpm. AK interval 178 ms. QRS duration 78 ms. QT/QTc 366/411 ms. P-R-T axes 72 21 18.     Imaging:  CT Chest/Abdomen/Pelvis w Contrast   Final Result   IMPRESSION:   1.  Age-indeterminate fracture of T8 may be chronic however no   comparison imaging of the thoracic spine is available. Please   correlate for tenderness in this area and if clinically indicated, a   follow-up thoracic spine MRI can be considered.   2.  Otherwise, no definite acute traumatic injury in the chest,   abdomen and pelvis.   3.  Mild groundglass opacities in the right lower lobe may be   infectious or inflammatory.   4.  Mild gallbladder distention and moderate intrahepatic and extra   hepatic biliary ductal dilatation has progressed since 2016. No   obstructing calculi or masses by CT. Correlate with liver function   tests and outpatient MRCP or ERCP could be considered.   5.  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 A 6 mm   obstructing calculus in the mid right ureter previously measured 12   mm.    6.  Other incidental findings as discussed above.      SIMI SNIDER MD            SYSTEM ID:  [id number]      CT Lumbar Spine w/o Contrast   Final Result   IMPRESSION:   1. No definite acute lumbar spine fracture.   2. Multilevel degenerative changes, as described.   3. Moderate dextroconvex curvature of the lumbar spine with mild/mild   to moderate multilevel degenerative spondylolisthesis.      RICHARD ROGEL MD            SYSTEM ID:  [id number]      CT Cervical Spine w/o Contrast   Final Result   IMPRESSION:   1. No acute cervical spine fracture.   2. Anterolisthesis of C4 on C5 and C5 on C6,  unchanged.   3. Multilevel degenerative changes, as described.      RICHARD ROGEL MD            SYSTEM ID:  [id number]      Head CT w/o contrast   Final Result   IMPRESSION:   1. No CT findings of acute intracranial process.   2. Brain atrophy and presumed chronic small vessel ischemic changes,   as described.      RICHARD ROGEL MD            SYSTEM ID:  [id number]             Laboratory:  Labs Ordered and Resulted from Time of ED Arrival to Time of ED Departure   COMPREHENSIVE METABOLIC PANEL - Abnormal       Result Value    Sodium 145      Potassium 3.8      Carbon Dioxide (CO2) 28      Anion Gap 12      Urea Nitrogen 31.1 (*)     Creatinine 1.24 (*)     GFR Estimate 39 (*)     Calcium 13.3 (*)     Chloride 105      Glucose 104 (*)     Alkaline Phosphatase 160 (*)     AST 26      ALT 21      Protein Total 7.0      Albumin 4.2      Bilirubin Total 0.6     ROUTINE UA WITH MICROSCOPIC REFLEX TO CULTURE - Abnormal    Color Urine Straw      Appearance Urine Cloudy (*)     Glucose Urine Negative      Bilirubin Urine Negative      Ketones Urine Negative      Specific Gravity Urine 1.009      Blood Urine Negative      pH Urine 7.5 (*)     Protein Albumin Urine Negative      Urobilinogen Urine Normal      Nitrite Urine Negative      Leukocyte Esterase Urine Negative      Bacteria Urine Few (*)     Amorphous Crystals Urine Few (*)     RBC Urine 2      WBC Urine 6 (*)    CBC WITH PLATELETS AND DIFFERENTIAL - Abnormal    WBC Count 7.2      RBC Count 3.62 (*)     Hemoglobin 11.9      Hematocrit 35.9      MCV 99      MCH 32.9      MCHC 33.1      RDW 14.1      Platelet Count 241      % Neutrophils 78      % Lymphocytes 11      % Monocytes 9      % Eosinophils 1      % Basophils 1      % Immature Granulocytes 0      NRBCs per 100 WBC 0      Absolute Neutrophils 5.6      Absolute Lymphocytes 0.8      Absolute Monocytes 0.7      Absolute Eosinophils 0.0      Absolute Basophils 0.0      Absolute Immature Granulocytes 0.0       Absolute NRBCs 0.0     IONIZED CALCIUM - Abnormal    Calcium Ionized Whole Blood 6.5 (*)    CK TOTAL - Normal    CK 61     PROCALCITONIN - Normal    Procalcitonin 0.13     URINE CULTURE        Emergency Department Course & Assessments:    Interventions:  Medications   diltiazem ER COATED BEADS (CARDIZEM CD/CARTIA XT) 24 hr capsule 240 mg (has no administration in time range)   acetaminophen (TYLENOL) tablet 1,000 mg (has no administration in time range)   pantoprazole (PROTONIX) EC tablet 40 mg (has no administration in time range)   senna-docusate (SENOKOT-S/PERICOLACE) 8.6-50 MG per tablet 1 tablet (has no administration in time range)     Or   senna-docusate (SENOKOT-S/PERICOLACE) 8.6-50 MG per tablet 2 tablet (has no administration in time range)   ondansetron (ZOFRAN ODT) ODT tab 4 mg (has no administration in time range)     Or   ondansetron (ZOFRAN) injection 4 mg (has no administration in time range)   lidocaine 1 % 0.1-1 mL (has no administration in time range)   lidocaine (LMX4) cream (has no administration in time range)   sodium chloride (PF) 0.9% PF flush 3 mL (has no administration in time range)   sodium chloride (PF) 0.9% PF flush 3 mL (has no administration in time range)   sodium chloride 0.9 % infusion (has no administration in time range)   polyethylene glycol (MIRALAX) Packet 17 g (has no administration in time range)   sodium chloride 0.9% BOLUS 1,000 mL (1,000 mLs Intravenous $New Bag 4/22/24 1117)   iopamidol (ISOVUE-370) solution 50 mL (50 mLs Intravenous $Given 4/22/24 1233)   sodium chloride (PF) 0.9% PF flush 52 mL (52 mLs Intravenous $Given 4/22/24 1233)        Assessments:  1100 I obtained history and performed physical exam as noted above.   1112 I spoke with the patient's daughter for additional information.    Independent Interpretation (X-rays, CTs, rhythm strip):  Independently reviewed CT head which shows no evidence of bleed mass or skull fracture.    Consultations/Discussion of  Management or Tests:  I spoke with Leidy Poeluda hospitalist PA who agrees to accept the patient for observation to Dr. Lerner.       Social Determinants of Health affecting care:   Transportation  Pt unable to drive self.  Disposition:  The patient was admitted to the hospital under the care of Dr. Lerner.     Impression & Plan    CMS Diagnoses: None         Medical Decision Makin-year-old female who presents after unwitnessed fall at her assisted living facility with new increased confusion and weakness per daughter.  Broad differential considered.  From a trauma standpoint her CT scans show no evidence of acute traumatic injury show age indeterminant T8 fracture which is previously noted and she has no tenderness there  Supporting this being old.  Exam of the extremities does not demonstrate concern for other serious injury.  Neurologic exam is normal and she is not exhibiting any stroke or seizure-like activity.    Per daughter patient still seems much more out of it than usual.  In the past this has been due to UTIs though here urine is certainly not convincing for UTI no urinary symptoms no fevers leukocytosis tachycardia Pro-Onur is low and seems very unlikely that this would be the cause of symptoms.  Her scan does show a possible infiltrate in the right lung but no cough or symptoms of pneumonia.  Also shows possible hepatobiliary pathology though no real focal tenderness to that area and again in light of no fever, obstructive LFT pattern, or leukocytosis or real localizing symptoms after discussion with hospitalist service we will hold off on antibiotics for the time being.  No chest pain or shortness of breath EKG shows sinus rhythm with low voltage QRS unchanged from prior and nothing to suggest cardiac syncopal episode ACS PE arrhythmia etc.      Labs are notable for some hypercalcemia and it sounds like she has been in the process of undergoing some workup for this as an outpatient and is also  recently started treatment for osteoporosis.  I do not see any recent calcium values for comparison but I do see recent testing for parathyroid vitamin D and protein electrophoresis testing.  Her ionized calcium is 6.5 here certainly possible this could be contributing to her encephalopathy on some level.  Will  hydrate and treat with dilution for now.  Electrolytes glucose kidney and liver function otherwise fairly unremarkable.  Given the patient's advanced age unwitnessed falls and concern for ongoing encephalopathy we will admit to the hospital for observation.  Discussed with hospitalist who agrees to accept.    Diagnosis:    ICD-10-CM    1. Hypercalcemia  E83.52       2. Unwitnessed fall  R29.6       3. Acute encephalopathy  G93.40       4. Infiltrate of lung present on imaging of chest  R91.8            Discharge Medications:  New Prescriptions    No medications on file          Scribe Disclosure:  Cyndi DUNN, am serving as a scribe at 11:11 AM on 4/22/2024 to document services personally performed by Blaise Qiu PA based on my observations and the provider's statements to me.     4/22/2024   Blaise Qiu PA [place], Clark [place], PA-RANDI  04/22/24 4715

## 2024-04-22 NOTE — ED TRIAGE NOTES
Arrives via EMS from nursing home for unwitnessed fall. Per EMS, patient had a fall last night and did not want to come to the hospital with EMS at that time because she was feeling ok. Patient was then found on floor this morning by staff when they went to give her AM meds. Patient found on bedroom floor with pants only on the right leg. Patient stated at that time that she was trying to put her pants on and lost her balance and fell. C/O low back pain. C collar on per protocol. Patient told EMS that she feels a bump on the back of her head but EMS could not palpate or locate one. Unknown if patient on thinners. No BG.

## 2024-04-22 NOTE — PHARMACY-ADMISSION MEDICATION HISTORY
Pharmacist Admission Medication History    Admission medication history is complete. The information provided in this note is only as accurate as the sources available at the time of the update.    Information Source(s): Family member and CareEverywhere/SureScripts via in-person    Pertinent Information: Usually takes meds on her own, family aware of last doses for Tymlos, atorvastatin but unclear if patient took of her other meds this AM.    Changes made to PTA medication list:  Added: Tymlos (abaloparatide), Calcium 600 mg, diltiazem 240 mg.  Deleted: amlodipine, cetirizine 10 mg, Flovent [id number], gabapentin 8% Gel, Lidoderm 5% Patch, losartan 50 mg, oxycodone.   Changed: omeprazole to 10 mg daily, acetaminopohen from capsule to tablet.    Allergies reviewed with patient and updates made in EHR: yes    Medication History Completed By: Deangelo Powell [facility] ED 4/22/2024 3:27 PM      PTA Med List   Medication Sig Last Dose    Abaloparatide 3120 MCG/1.56ML SOPN injection Inject 80 mcg Subcutaneous at bedtime (80 mcg dose pen injector) 4/21/2024 at HS    acetaminophen (TYLENOL) 500 MG tablet Take 1,000 mg by mouth 3 times daily Unknown    albuterol (PROAIR HFA, PROVENTIL HFA, VENTOLIN HFA) 108 (90 BASE) MCG/ACT inhaler Inhale 2 puffs into the lungs every 4 hours as needed Gets refilled once a year, never uses. More than a year    atorvastatin (LIPITOR) 40 MG tablet Take 40 mg by mouth At Bedtime 4/21/2024 at HS    calcium carbonate (OS-DEANA) 1500 (600 Ca) MG tablet Take 600 mg by mouth daily 4/20/2024 at AM    Cholecalciferol (VITAMIN D3 PO) Take 2,000 Units by mouth daily  4/19/2024 at AM    diltiazem ER (DILT-XR) 240 MG 24 hr ER beaded capsule Take 240 mg by mouth daily 4/22 Not known if patient took this morning or not. Prior dose was on 4/20/24. at AM    multivitamin, therapeutic with minerals (MULTI-VITAMIN) TABS Take 1 tablet by mouth daily 4/19/2024 at AM    omeprazole (PRILOSEC) 10 MG DR capsule Take 10 mg by  mouth daily 4/20/2024 at AM

## 2024-04-22 NOTE — PROVIDER NOTIFICATION
Verbally discussed with provider - pt's current 180systolic HTN, and HTN trend. MD aware - said no changes. Pending arrival of scheduled BP medication from pharmacy.

## 2024-04-22 NOTE — PROGRESS NOTES
[facility]    ED Boarding Nurse Handoff Addendum Report:    Date/time: 4/22/2024, 6:27 PM    Activity Level: assist of 1    Fall Risk: Yes:  activity supervised    Active Infusions: none now - to go on NS @ 100    Current Meds Due: none    Current care needs: fall risk - fluids     Oxygen requirements (liters/min and/or FiO2): RA    Respiratory status: Room air    Vital signs (within last 30 minutes):    Vitals:    04/22/24 1445 04/22/24 1500 04/22/24 1515 04/22/24 1717   BP: (!) 198/92 (!) 189/91 (!) 172/84 (!) 184/97   Pulse: 85 86 86    Resp: 18 (!) 9     Temp:       TempSrc:       SpO2:           Focused assessment within last 30 minutes:    Intermittent confusion - intermittent wanting to leave bed - family in room. Pt redirectable. Denies pain. Currently Aox4. Fell 2 in last day. New increased confusion. Morongo.     HTN - see provider not fication. Diltiazem arrived to unit at 1814 - administered.    ED Boarding Nurse name: Rojas Chauhan RN

## 2024-04-22 NOTE — ED NOTES
Pt returned from imaging. C collar remains in place. Pt denies needs or concerns. Family at the bedside. Pt does have hearing aids that are out at this time. They are in the custody of her family. Will replace once c collar is able to be removed. Side rails are up times two.

## 2024-04-22 NOTE — CONSULTS
Care Management Initial Consult    General Information  Assessment completed with: Patient, Children,    Type of CM/SW Visit: Initial Assessment    Primary Care Provider verified and updated as needed:     Readmission within the last 30 days: no previous admission in last 30 days      Reason for Consult: discharge planning  Advance Care Planning:            Communication Assessment  Patient's communication style: spoken language (English or Bilingual)             Cognitive  Cognitive/Neuro/Behavioral: .WDL except, orientation  Level of Consciousness: intermittent confusion  Arousal Level: opens eyes spontaneously  Orientation: disoriented to, time  Mood/Behavior: calm, cooperative  Best Language: 0 - No aphasia  Speech: spontaneous, logical, clear    Living Environment:   People in home: facility resident     Current living Arrangements: assisted living      Able to return to prior arrangements:         Family/Social Support:  Care provided by: self, other (see comments)  Provides care for: no one, unable/limited ability to care for self     Children          Description of Support System: Supportive, Involved    Support Assessment: Adequate family and caregiver support    Current Resources:   Patient receiving home care services: No     Community Resources: None  Equipment currently used at home:    Supplies currently used at home: None    Employment/Financial:  Employment Status:          Financial Concerns:             Does the patient's insurance plan have a 3 day qualifying hospital stay waiver?  No    Lifestyle & Psychosocial Needs:  Social Determinants of Health     Food Insecurity: No Food Insecurity (10/16/2023)    [facility]    Food Insecurity     Worried About Running Out of Food in the Last Year: 1   Depression: Not at risk (5/23/2023)    [facility], [employer]  [facility]    PHQ-2     PHQ-2 TOTAL SCORE: 0   Housing Stability: Low Risk  (10/16/2023)    Received from [facility] [employer] [facility], [facility] [facility]    Housing Stability     Unable to Pay for Housing in the Last Year: 1   Tobacco Use: Low Risk  (4/5/2024)    Received from [facility]    Patient History     Smoking Tobacco Use: Never     Smokeless Tobacco Use: Never     Passive Exposure: Not on file   Financial Resource Strain: Low Risk  (10/16/2023)    Received from [facility] [employer] [facility], [facility]    Financial Resource Strain     Difficulty of Paying Living Expenses: 3     Difficulty of Paying Living Expenses: Not on file   Alcohol Use: Not on file   Transportation Needs: No Transportation Needs (10/16/2023)    Received from [facility] [employer] [facility], [facility]    Transportation Needs     Lack of Transportation (Medical): 1   Physical Activity: Not on file   Interpersonal Safety: Not on file   Stress: Not on file   Social Connections: Socially Integrated (10/16/2023)    Received from [facility] [employer] [facility] [employer] [facility], [facility]    Social Connections     Frequency of Communication with Friends and Family: 0   Health Literacy: Not on file       Functional Status:  Prior to admission patient needed assistance:   Dependent ADLs:: Ambulation-walker, Dressing  Dependent IADLs:: Cleaning, Cooking, Meal Preparation, Medication Management  Assesssment of Functional Status: Not at baseline with ADL Functioning    Mental Health Status:          Chemical Dependency Status:                Values/Beliefs:  Spiritual, Cultural Beliefs, Christianity Practices, Values that affect care:                 Additional Information:    SHANNON met with pt/daughter Jennie while boarding in the  ED to introduce SW role and discuss discharge planning. Pt daughter reports that pt lives at [facility] and signed into group home last week. Prior to this, she was ILF. Pt receives assist with meals, medications, dressing, laundry, and escorts to meals. Pt ambulates independently with a walker at baseline. Pt daughter is accepting of SW reaching out to [facility]. SW discussed ARDON letter and observation status. Explained that PT is consulted and if SNF is recommended, there would be no insurance coverage due to observation status. Pt daughter is aware.     SW left VM for [facility] (P: [phone]). SW following.     Merry Lerner/ADRIENNE Fong, LGSHANNON  Inpatient Care Coordination  Emergency Room /Float  [phone]    Merry Lerner, SW

## 2024-04-22 NOTE — ED NOTES
"Patient's daughter called RN to room to inform that patient is continuously repeating the word \"full\". RN to bedside, patient quiet when spoken to, and then resumed saying the word \"full\" over and over. MD notified.   "

## 2024-04-23 ENCOUNTER — APPOINTMENT (OUTPATIENT)
Dept: PHYSICAL THERAPY | Facility: CLINIC | Age: 89
DRG: 689 | End: 2024-04-23
Attending: PHYSICIAN ASSISTANT
Payer: MEDICARE

## 2024-04-23 LAB
ANION GAP SERPL CALCULATED.3IONS-SCNC: 14 MMOL/L (ref 7–15)
BACTERIA UR CULT: NO GROWTH
BUN SERPL-MCNC: 23.8 MG/DL (ref 8–23)
CALCIUM SERPL-MCNC: 10.7 MG/DL (ref 8.2–9.6)
CHLORIDE SERPL-SCNC: 104 MMOL/L (ref 98–107)
CREAT SERPL-MCNC: 1.1 MG/DL (ref 0.51–0.95)
DEPRECATED HCO3 PLAS-SCNC: 24 MMOL/L (ref 22–29)
EGFRCR SERPLBLD CKD-EPI 2021: 45 ML/MIN/1.73M2
ERYTHROCYTE [DISTWIDTH] IN BLOOD BY AUTOMATED COUNT: 13.9 % (ref 10–15)
GLUCOSE SERPL-MCNC: 76 MG/DL (ref 70–99)
HCT VFR BLD AUTO: 35.7 % (ref 35–47)
HGB BLD-MCNC: 11.8 G/DL (ref 11.7–15.7)
MCH RBC QN AUTO: 32.5 PG (ref 26.5–33)
MCHC RBC AUTO-ENTMCNC: 33.1 G/DL (ref 31.5–36.5)
MCV RBC AUTO: 98 FL (ref 78–100)
PLATELET # BLD AUTO: 250 10E3/UL (ref 150–450)
POTASSIUM SERPL-SCNC: 3.1 MMOL/L (ref 3.4–5.3)
POTASSIUM SERPL-SCNC: 3.4 MMOL/L (ref 3.4–5.3)
RBC # BLD AUTO: 3.63 10E6/UL (ref 3.8–5.2)
SODIUM SERPL-SCNC: 142 MMOL/L (ref 135–145)
WBC # BLD AUTO: 6.3 10E3/UL (ref 4–11)

## 2024-04-23 PROCEDURE — 97116 GAIT TRAINING THERAPY: CPT | Mod: GP | Performed by: PHYSICAL THERAPIST

## 2024-04-23 PROCEDURE — 120N000001 HC R&B MED SURG/OB

## 2024-04-23 PROCEDURE — 36415 COLL VENOUS BLD VENIPUNCTURE: CPT | Performed by: PHYSICIAN ASSISTANT

## 2024-04-23 PROCEDURE — 84132 ASSAY OF SERUM POTASSIUM: CPT | Performed by: NURSE PRACTITIONER

## 2024-04-23 PROCEDURE — 97161 PT EVAL LOW COMPLEX 20 MIN: CPT | Mod: GP | Performed by: PHYSICAL THERAPIST

## 2024-04-23 PROCEDURE — G0378 HOSPITAL OBSERVATION PER HR: HCPCS

## 2024-04-23 PROCEDURE — 36415 COLL VENOUS BLD VENIPUNCTURE: CPT | Performed by: NURSE PRACTITIONER

## 2024-04-23 PROCEDURE — 250N000013 HC RX MED GY IP 250 OP 250 PS 637: Performed by: NURSE PRACTITIONER

## 2024-04-23 PROCEDURE — 85027 COMPLETE CBC AUTOMATED: CPT | Performed by: PHYSICIAN ASSISTANT

## 2024-04-23 PROCEDURE — 99232 SBSQ HOSP IP/OBS MODERATE 35: CPT | Performed by: NURSE PRACTITIONER

## 2024-04-23 PROCEDURE — 97530 THERAPEUTIC ACTIVITIES: CPT | Mod: GP | Performed by: PHYSICAL THERAPIST

## 2024-04-23 PROCEDURE — 250N000013 HC RX MED GY IP 250 OP 250 PS 637: Performed by: PHYSICIAN ASSISTANT

## 2024-04-23 PROCEDURE — 80048 BASIC METABOLIC PNL TOTAL CA: CPT | Performed by: PHYSICIAN ASSISTANT

## 2024-04-23 PROCEDURE — 258N000003 HC RX IP 258 OP 636: Performed by: NURSE PRACTITIONER

## 2024-04-23 RX ORDER — CIPROFLOXACIN 250 MG/1
250 TABLET, FILM COATED ORAL
Status: DISCONTINUED | OUTPATIENT
Start: 2024-04-23 | End: 2024-04-25 | Stop reason: HOSPADM

## 2024-04-23 RX ORDER — SODIUM CHLORIDE 9 MG/ML
INJECTION, SOLUTION INTRAVENOUS CONTINUOUS
Status: DISCONTINUED | OUTPATIENT
Start: 2024-04-23 | End: 2024-04-25 | Stop reason: HOSPADM

## 2024-04-23 RX ORDER — POTASSIUM CHLORIDE 1.5 G/1.58G
20 POWDER, FOR SOLUTION ORAL ONCE
Status: COMPLETED | OUTPATIENT
Start: 2024-04-23 | End: 2024-04-23

## 2024-04-23 RX ADMIN — CIPROFLOXACIN HYDROCHLORIDE 250 MG: 250 TABLET, FILM COATED ORAL at 14:31

## 2024-04-23 RX ADMIN — ACETAMINOPHEN 1000 MG: 500 TABLET, FILM COATED ORAL at 10:48

## 2024-04-23 RX ADMIN — DILTIAZEM HYDROCHLORIDE 240 MG: 240 CAPSULE, COATED, EXTENDED RELEASE ORAL at 11:04

## 2024-04-23 RX ADMIN — POTASSIUM CHLORIDE 20 MEQ: 1.5 POWDER, FOR SOLUTION ORAL at 11:53

## 2024-04-23 RX ADMIN — ACETAMINOPHEN 1000 MG: 500 TABLET, FILM COATED ORAL at 20:32

## 2024-04-23 RX ADMIN — PANTOPRAZOLE SODIUM 40 MG: 40 TABLET, DELAYED RELEASE ORAL at 11:04

## 2024-04-23 RX ADMIN — SODIUM CHLORIDE: 9 INJECTION, SOLUTION INTRAVENOUS at 14:27

## 2024-04-23 RX ADMIN — ACETAMINOPHEN 1000 MG: 500 TABLET, FILM COATED ORAL at 14:29

## 2024-04-23 ASSESSMENT — ACTIVITIES OF DAILY LIVING (ADL)
ADLS_ACUITY_SCORE: 32
ADLS_ACUITY_SCORE: 36
ADLS_ACUITY_SCORE: 36
ADLS_ACUITY_SCORE: 32
ADLS_ACUITY_SCORE: 36
ADLS_ACUITY_SCORE: 32
ADLS_ACUITY_SCORE: 36
ADLS_ACUITY_SCORE: 36
ADLS_ACUITY_SCORE: 32
ADLS_ACUITY_SCORE: 32
ADLS_ACUITY_SCORE: 36
ADLS_ACUITY_SCORE: 36
ADLS_ACUITY_SCORE: 32
ADLS_ACUITY_SCORE: 32
ADLS_ACUITY_SCORE: 36
ADLS_ACUITY_SCORE: 36
ADLS_ACUITY_SCORE: 32

## 2024-04-23 NOTE — PROGRESS NOTES
PRIMARY DIAGNOSIS: GENERALIZED WEAKNESS    OUTPATIENT/OBSERVATION GOALS TO BE MET BEFORE DISCHARGE  1. Orthostatic performed: No    2. Tolerating PO medications: Yes    3. Return to near baseline physical activity: No    4. Cleared for discharge by consultants (if involved): No    Discharge Planner Nurse   Safe discharge environment identified: No  Barriers to discharge: Yes       Entered by: Judith León RN 04/23/2024 4:07 AM  Vitals are Temp: 98.2  F (36.8  C) Temp src: Oral BP: (!) 159/84 Pulse: 99   Resp: 18 SpO2: 96 %.  Patient is Alert and Oriented x4. They are 2 assist with Gait Belt and Walker.  Pt is a Regular diet.  They are denying pain.  Patient is Saline locked.  Please review provider order for any additional goals.   Nurse to notify provider when observation goals have been met and patient is ready for discharge.

## 2024-04-23 NOTE — PROGRESS NOTES
PRIMARY DIAGNOSIS: GENERALIZED WEAKNESS    OUTPATIENT/OBSERVATION GOALS TO BE MET BEFORE DISCHARGE  1. Orthostatic performed: No    2. Tolerating PO medications: Yes    3. Return to near baseline physical activity: No    4. Cleared for discharge by consultants (if involved): No    Discharge Planner Nurse   Safe discharge environment identified: No  Barriers to discharge: Yes       Entered by: Judith León RN 04/23/2024 12:57 AM     Please review provider order for any additional goals.   Nurse to notify provider when observation goals have been met and patient is ready for discharge.

## 2024-04-23 NOTE — PLAN OF CARE
Goal Outcome Evaluation:    PRIMARY DIAGNOSIS: GENERALIZED WEAKNESS/Fall     OUTPATIENT/OBSERVATION GOALS TO BE MET BEFORE DISCHARGE  1. Orthostatic performed: No    2. Tolerating PO medications: Yes    3. Return to near baseline physical activity: No    4. Cleared for discharge by consultants (if involved): No    Discharge Planner Nurse   Safe discharge environment identified: No  Barriers to discharge: Yes       Entered by: Emeka Haskins RN 04/22/2024    Please review provider order for any additional goals.   Nurse to notify provider when observation goals have been met and patient is ready for discharge.    Patient alert and oriented to self , VSS on RA,IVF infusing running 100 ml/hr,denies pain,pure wick in place,PT/SW consult, Family bed side.  Problem: Adult Inpatient Plan of Care  Goal: Absence of Hospital-Acquired Illness or Injury  Intervention: Identify and Manage Fall Risk  Recent Flowsheet Documentation  Taken 4/22/2024 2119 by Emeka Haskins RN  Safety Promotion/Fall Prevention: (family with pt)   activity supervised   other (see comments)  Goal: Readiness for Transition of Care  Recent Flowsheet Documentation  Taken 4/22/2024 2000 by Emeka Haskins RN  Transportation Anticipated: family or friend will provide  Intervention: Mutually Develop Transition Plan  Recent Flowsheet Documentation  Taken 4/22/2024 2000 by Emeka Haskins RN  Transportation Anticipated: family or friend will provide  Transportation Concerns: none  Patient/Family Anticipated Services at Transition: none  Patient/Family Anticipates Transition to: assisted living  Equipment Currently Used at Home: walker, rolling

## 2024-04-23 NOTE — PROGRESS NOTES
04/23/24 0930   Appointment Info   Signing Clinician's Name / Credentials (PT) Kaylen Long DPT   Rehab Comments (PT) [place], hearing aides help. Discharge barriers: medicare obs status without TCU coverage per OBS unit rounds, needing Ax1 with all mobility   Quick Adds   Quick Adds Certification   Living Environment   People in Home facility resident   Current Living Arrangements assisted living   Home Accessibility no concerns   Transportation Anticipated family or friend will provide   Living Environment Comments Pt lives in [facility]; has lived in [facility] ~ 15 years and just transitioned to [facility] last week. Pt is still in the same room with increased services. Uses 4WW and is normally independent with mobility. Accessible bathroom with grab bars.   Self-Care   Usual Activity Tolerance moderate   Current Activity Tolerance fair   Equipment Currently Used at Home walker, rolling   Fall history within last six months yes   Number of times patient has fallen within last six months 2   Activity/Exercise/Self-Care Comment Pt normally Mod I with walker. Increased services as patient transitioned to [facility] recently. She currently receives the following cares from [facility] staff: 3 safety checks a day, escorts to dinner every night, escorts to hair appts/bingo in building, cleaning, staff cane be SBA for dressing. Family sets up medications 1x/week and patient normally can independently take them each day.   General Information   Onset of Illness/Injury or Date of Surgery 04/22/24   Referring Physician Leidy Morales PA-C   Patient/Family Therapy Goals Statement (PT) per son and daugther present, they would like her to get stronger before returning home   Pertinent History of Current Problem (include personal factors and/or comorbidities that impact the POC) 89 year old female with PMHx significant for HTN, hx of SVT, HLP, CKD, asthma, osteoporosis and fairly recent T8 compression fx, who was admitted on 4/22/2024  with acute encephalopathy. She has had 2 falls in the past 2 days.   Existing Precautions/Restrictions fall   Weight-Bearing Status - LUE full weight-bearing   Weight-Bearing Status - RUE full weight-bearing   Weight-Bearing Status - LLE full weight-bearing   Weight-Bearing Status - RLE full weight-bearing   Cognition   Affect/Mental Status (Cognition) confused   Orientation Status (Cognition) oriented to;person   Follows Commands (Cognition) increased processing time needed;repetition of directions required   Memory Deficit (Cognition) recall, recent events   Cognitive Status Comments pt more confused than normal per family report; pt unaware of location, date/time, or how she got to the hospital. [place] but this improved once her hearing aides were in   Pain Assessment   Patient Currently in Pain No   Posture    Posture Kyphosis   Posture Comments significant thoracic kyphosis noted with sitting and ambulating   Strength (Manual Muscle Testing)   Strength (Manual Muscle Testing) Deficits observed during functional mobility   Strength Comments no focal weakness, generalized weakness and frailty noted   Bed Mobility   Comment, (Bed Mobility) Min A supine <> sit   Transfers   Comment, (Transfers) Min A sit <> stand   Gait/Stairs (Locomotion)   [place] Level (Gait) minimum assist (75% patient effort)   Assistive Device (Gait) walker, 4-wheeled   Distance in Feet (Gait) 20'   Comment, (Gait/Stairs) slow, gait and pt expresses a fear of falling d/t weakness   Balance   Balance Comments balance deficits related to generalized weakness and frailty   Sensory Examination   Sensory Perception patient reports no sensory changes   Clinical Impression   Criteria for Skilled Therapeutic Intervention Yes, treatment indicated   PT Diagnosis (PT) impaired mobility   Influenced by the following impairments impaired balance, weakness, confusion   Functional limitations due to impairments fall risk, decreased activity tolerance    Clinical Presentation (PT Evaluation Complexity) stable   Clinical Presentation Rationale clinical judgement   Clinical Decision Making (Complexity) low complexity   Planned Therapy Interventions (PT) balance training;bed mobility training;gait training;patient/family education;strengthening;transfer training;neuromuscular re-education   Risk & Benefits of therapy have been explained evaluation/treatment results reviewed;care plan/treatment goals reviewed;risks/benefits reviewed;current/potential barriers reviewed;participants voiced agreement with care plan;patient;participants included   PT Total Evaluation Time   PT Eval, Low Complexity Minutes (17933) 30   Therapy Certification   Start of care date 04/23/24   Certification date from 04/23/24   Certification date to 05/03/24   Medical Diagnosis acute encephalopathy   Physical Therapy Goals   PT Frequency Daily   PT Predicted Duration/Target Date for Goal Attainment 04/30/24   PT Goals Bed Mobility;Transfers;Gait   PT: Bed Mobility Supervision/stand-by assist;Supine to/from sit   PT: Transfers Modified independent;Sit to/from stand;Bed to/from chair;Assistive device   PT: Gait Modified independent;Rolling walker;50 feet   Interventions   Interventions Quick Adds Therapeutic Activity;Gait Training   Therapeutic Activity   Therapeutic Activities: dynamic activities to improve functional performance Minutes (96389) 15   Symptoms Noted During/After Treatment Fatigue   Treatment Detail/Skilled Intervention Pt needing hand over hand tactile cues for bed mobility supine <> sit. Requires HOB elevated ~ 20 deg and use of bed rail. Sitting SBA but fatigues at times and leans back. Min A for sit <> stand with cues for safer hand placement. Min A for donning/doffing breif in bathroom, pt ind with pericares. SBA for hand washing at sink d/t mild unsteadiness. Pt up in chair at end of session with chair alarm on and oriention to call light. Pt confused and requires increased  "time for all command follow.   Gait Training   Gait Training Minutes (80366) 10   Symptoms Noted During/After Treatment (Gait Training) fatigue   Treatment Detail/Skilled Intervention ambulation with 4WW and Min A, cues for upright gaze which pt is able to do only intermittently. Limited balance d/t significant kyphosis and generalized weakness. Pt expresses concern with how weak she feels and states \"I should not be walking this much\".   Distance in Feet 60' x 2   [place] Level (Gait Training) minimum assist (75% patient effort)   Assistive Device (Gait Training) rolling walker   PT Discharge Planning   PT Discharge Recommendation (DC Rec) Transitional Care Facility;home with assist;home with home care physical therapy   PT Rationale for DC Rec Pt currently below independent with walker baseline due to generalized weakness/frailty and impaired cognition. Would benefit from TCU to address these deficits and improve safety before returning to [facility]. Patient will need CGA/Min A x 1 with all mobility to prevent falls if she returns back to [facility] today. Will continue to assess if patient able to return back to [facility] with increased services, Ax1 and HHPT.   PT Brief overview of current status Min A x 1 with 4WW   PT Equipment Needed at Discharge   (has necessary equipment)   Total Session Time   Timed Code Treatment Minutes 25   Total Session Time (sum of timed and untimed services) 55   [facility]  OUTPATIENT PHYSICAL THERAPY EVALUATION  PLAN OF TREATMENT FOR OUTPATIENT REHABILITATION  (COMPLETE FOR INITIAL CLAIMS ONLY)  Patient's Last Name, First Name, M.I.  YOB: 1925  Michelle Thomas                        Provider's Name  [facility] Medical Record No.  [id number]                             Onset Date:  04/22/24   Start of Care Date:  04/23/24   Type:     _X_PT   ___OT   ___SLP Medical Diagnosis:  acute encephalopathy              PT " Diagnosis:  impaired mobility Visits from SOC:  1     See note for plan of treatment, functional goals and certification details    I CERTIFY THE NEED FOR THESE SERVICES FURNISHED UNDER        THIS PLAN OF TREATMENT AND WHILE UNDER MY CARE     (Physician co-signature of this document indicates review and certification of the therapy plan).

## 2024-04-23 NOTE — UTILIZATION REVIEW
Admission Status; Secondary Review Determination         Under the authority of the Utilization Management Committee, the utilization review process indicated a secondary review on the above patient.  The review outcome is based on review of the medical records, discussions with staff, and applying clinical experience noted on the date of the review.        (x)      Inpatient Status Appropriate - This patient's medical care is consistent with medical management for inpatient care and reasonable inpatient medical practice.     RATIONALE FOR DETERMINATION   The patient is a 89-year-old female admitted on 4/22/2024.  Patient was brought to the ED due to increasing degree of encephalopathic.  Family has noted a significant change in the days prior to admission.  On admission the patient is noted to have an abnormal chest x-ray with right lower lobe groundglass appearance.  Patient also has intra and extra hepatic biliary duct dilatation with gallbladder findings also.  Bilirubin total however is normal.  Calcium is elevated at 10.7 and ionized whole blood calcium is increased at 6.5 without clear etiology at this time.  Patient was recently started on daily Tymlos injections for osteoporosis.  T8 compression was also noted and reason for starting medication.  Creatinine admission was 1.24.  Patient is started on ciprofloxacin.  Given need to workup elevated calcium because and worsening encephalopathy, recommend changing from observation status to inpatient status.  Dr. Caceres will be notified of this recommendation.      The severity of illness, intensity of service provided, expected LOS and risk for adverse outcome make the care complex, high risk and appropriate for hospital admission.        The information on this document is developed by the utilization review team in order for the business office to ensure compliance.  This only denotes the appropriateness of proper admission status and does not reflect the  quality of care rendered.         The definitions of Inpatient Status and Observation Status used in making the determination above are those provided in the CMS Coverage Manual, Chapter 1 and Chapter 6, section 70.4.      Sincerely,     Trung Disla MD  Physician Advisor  Utilization Review/ Case Management  [facility].

## 2024-04-23 NOTE — CARE PLAN
ROOM # 222    Living Situation (if not independent, order SW consult):[facility]  Facility name:  : Daughter    Activity level at baseline: walker   Activity level on admit: Ax1 walker GB    Who will be transporting you at discharge: Family    Patient registered to observation; given Patient Bill of Rights; given the opportunity to ask questions about observation status and their plan of care.  Patient has been oriented to the observation room, bathroom and call light is in place.    Discussed discharge goals and expectations with patient/family.

## 2024-04-23 NOTE — PLAN OF CARE
"PRIMARY DIAGNOSIS: GENERALIZED WEAKNESS    OUTPATIENT/OBSERVATION GOALS TO BE MET BEFORE DISCHARGE  1. Orthostatic performed: No    2. Tolerating PO medications: Yes    3. Return to near baseline physical activity: No    4. Cleared for discharge by consultants (if involved): No    Discharge Planner Nurse   Safe discharge environment identified: No   Barriers to discharge: Yes need more PT possible rehab.         Entered by: Aubree Pacheco RN 04/23/2024 12:04 PM     Please review provider order for any additional goals.   Nurse to notify provider when observation goals have been met and patient is ready for discharge.          Plan of Care Reviewed With: patient, family    Overall Patient Progress: improvingOverall Patient Progress: improving    Outcome Evaluation: Pt weak, and tired today. Up to chair with PT. KCL replaced per KCL protocal. See MAR      Problem: Adult Inpatient Plan of Care  Goal: Plan of Care Review  Description: The Plan of Care Review/Shift note should be completed every shift.  The Outcome Evaluation is a brief statement about your assessment that the patient is improving, declining, or no change.  This information will be displayed automatically on your shift  note.  Outcome: Progressing  Flowsheets (Taken 4/23/2024 1201)  Outcome Evaluation: Pt weak, and tired today. Up to chair with PT. KCL replaced per KCL protocal. See MAR  Plan of Care Reviewed With:   patient   family  Overall Patient Progress: improving  Goal: Patient-Specific Goal (Individualized)  Description: You can add care plan individualizations to a care plan. Examples of Individualization might be:  \"Parent requests to be called daily at 9am for status\", \"I have a hard time hearing out of my right ear\", or \"Do not touch me to wake me up as it startles  me\".  Outcome: Progressing  Goal: Absence of Hospital-Acquired Illness or Injury  Outcome: Progressing  Goal: Optimal Comfort and Wellbeing  Outcome: Progressing  Goal: " Readiness for Transition of Care  Outcome: Progressing     Problem: Fall Injury Risk  Goal: Absence of Fall and Fall-Related Injury  Outcome: Progressing

## 2024-04-23 NOTE — PROGRESS NOTES
Care Management Follow Up    Length of Stay (days): 0    Expected Discharge Date: 04/24/2024     Concerns to be Addressed: all concerns addressed in this encounter     Patient plan of care discussed at interdisciplinary rounds: Yes    Anticipated Discharge Disposition:  TCU vs back to [facility] with HH  Anticipated Discharge Services:    Anticipated Discharge DME:      Patient/family educated on Medicare website which has current facility and service quality ratings:  yes  Education Provided on the Discharge Plan:  yes  Patient/Family in Agreement with the Plan:  yes    Referrals Placed by CM/SW:    Private pay costs discussed: transportation costs and out of pocket TCU cost    Additional Information:  CM following for discharge planning. PT recs home with CGA/min A x 1 for all mobility and HH PT vs TCU. CM met with patient and son Kalpesh at the bedside to share these recs, they were open to both options and requested CM reach out to patient's dtr [facility] to discuss, CM shared that d/t current observation status TCU would be an OOP expense. CM placed call to Jennie, [facility]. Awaiting return call.     CM spoke with Lana, clinical director at [facility] p: [phone] fax: [phone] to share these recs also. Per Lana they can provide a CGA of 1 for all mobility but they are concerned that patient is not able to follow directions, would prefer patient discharge to TCU.     Also per [facility] they do not manage the patient's medications for her.     Will continue to follow for discharge planning.     Addendum 1559: CM received return VM from Jennie requesting call back when able.     Phyllis Amaral, RN, BSN  Inpatient Care Coordination  [facility]  [phone]

## 2024-04-23 NOTE — PROGRESS NOTES
[facility]    Medicine Progress Note - Hospitalist Service    Date of Admission:  4/22/2024  HD #1     Assessment & Plan     Michelle Thomas is a 89 year old female with PMHx significant for HTN, hx of SVT, HLP, CKD, asthma, osteoporosis and fairly recent T8 compression fx, who was admitted on 4/22/2024 with acute encephalopathy. She has had 2 falls in the past 2 days.     B resting comfortably sitting in bedside chair.  Dtr and son (2 of 6 children present -- Jennie and Kalpesh).  Updates given.  Still confused.     #Acute encephalopathy  #Possible UTI  Family notes increased confusion from baseline since Friday, 4/19. No focal neurological deficits.   Etiology unclear, no obvious infection noted. UA shows negative LE, negative nitrites with 6 WBCs and bacteria. She does report dysuria. CT abd/pelvis does not show anything distinctly acute, mild ground glass opacity in RLL, mild gallbladder distention with mod intrahepatic and extra hepatic biliary ductal dilation, atrophic R kidney due to long standing obstruction.   CT head, CT cervical and lumbar spine negative for acute process or fx.  CMP stable, Cr likely at baseline at 1.24 (1.0-1.2), BUN mildly elevated at 31, alk phos 160, LFTs otherwise negative. Ca elevated at 13.3, ionized Ca 6.5. Procalcitonin 0.13. CBC unremarkable.     Suspect encephalopathy is multifactorial possible UTI vs hypercalcemia. Pt endorses dysuria but UC is pending. No abdominal pain, nausea or vomiting. No abd tenderness on exam, low suspicion for biliary obstruction. Sleep deprivation and constipation could contribute. Ca is elevated, pt was recently started on Tymlos injections for osteoporosis. Opacity noted on CXR, denies cough or SOB, she is not hypoxic or tachycardic to support respiratory infection.     - admitted to OBS but changed to inpatient based on acuity, increased needs/care.  - given IVF overnight with improvement in Ca but still not drinking enough on  own.  Will continue with IVF hydration for another 24 hours.   - repeat BMP and CBC in the AM   - hold Tymlos and Ca supplement  - follow urine culture. Start Cipro has received it in the past without issue.    - PT/ SW consult      #Hypokalemia  #Hypercalcemia  #Chronic T8 compression fx  Ca 13.3 with ionized Ca 6.5. pt recently started on daily Tymlos injections for osteoporosis and to help T8 compression fx heal. Also on Vitamin C and D supplementation.    - hold supplementation and Tymlos  - replete K  - repeat BMP in the AM.   - continue TID Tylenol     #HTN  #Hx of SVT    - resume home Diltiazem    #HLP    - hold statin while OBS    #CKD  Cr 1.24 here, likely his baseline. Baseline appears to fluctuate between 1.0-1.2    - continue with IVF  - repeat BMP in the AM.     #Asthma    Managed with PRN albuterol inhaler         Diet:  regular diet  DVT Prophylaxis: Pneumatic Compression Devices  [place] Catheter: Not present  Lines: None     Cardiac Monitoring: None  Code Status:  Full code. Discussed with pt as she was previously DNR. She is adamant that she wants to be full code to make it to 100. Explained likely poor outcome if pt was resuscitated but she stood firm in her wishes. Family would like to honor her wishes.    Clinically Significant Risk Factors Present on Admission        # Hypokalemia: Lowest K = 3.1 mmol/L in last 2 days, will replace as needed    # Hypercalcemia: Highest Ca = 13.3 mg/dL in last 2 days, will monitor as appropriate                        Disposition Plan     Medically Ready for Discharge: Anticipated Tomorrow           The patient's care was discussed with the Bedside Nurse, Care Coordinator/, Patient, and Patient's Family.    ALINA [facility]  Hospitalist Service  [facility]  Securely message with [employer] (more info)  Text page via [facility] Paging/Directory   ______________________________________________________________________    Interval  History   Assumed care of patient.  VSS. Chart, imaging, and data reviewed.   Denies any new complaints but is still disoriented.  Endorses dysuria and family states she is acting very similar to when she has a UTI.  However, in the setting of mild hypercalcemia, can also have similar symptoms of altered mental status, vague complaints, etc.     Physical Exam   Vital Signs: Temp: 98.1  F (36.7  C) Temp src: Axillary BP: (!) 159/86 Pulse: 91   Resp: 16 SpO2: 96 % O2 Device: None (Room air)    Weight: 92 lbs 9.49 oz    GEN:  Alert, oriented x 2, appears comfortable, NAD.  NECK:   Supple ,no mass or thyromegaly   HEENT:  Normocephalic/atraumatic, no scleral icterus, no nasal discharge, mouth moist.  CV:   Regular rate and rhythm, no murmur or JVD.  S1 + S2 noted, no S3 or S4.  LUNGS:   Clear to auscultation bilaterally without rales/rhonchi/wheezing/retractions.  Symmetric chest rise on inhalation noted.  ABD:   Active bowel sounds, soft, non-tender/non-distended.  No rebound/guarding/rigidity.  EXT:   No edema.  No cyanosis.  No joint synovitis noted.  SKIN:   Dry to touch, no exanthems noted in the visualized areas.  Neurologic: Grossly intact,non focal.       Medical Decision Making       45 MINUTES SPENT BY ME on the date of service doing chart review, history, exam, documentation & further activities per the note.      Data   ------------------------- PAST 24 HR DATA REVIEWED -----------------------------------------------    I have personally reviewed the following data over the past 24 hrs:    6.3  \   11.8   / 250     142 104 23.8 (H) /  76   3.1 (L) 24 1.10 (H) \       Imaging results reviewed over the past 24 hrs:   No results found for this or any previous visit (from the past 24 hour(s)).

## 2024-04-24 ENCOUNTER — APPOINTMENT (OUTPATIENT)
Dept: PHYSICAL THERAPY | Facility: CLINIC | Age: 89
DRG: 689 | End: 2024-04-24
Payer: MEDICARE

## 2024-04-24 LAB
ANION GAP SERPL CALCULATED.3IONS-SCNC: 13 MMOL/L (ref 7–15)
BUN SERPL-MCNC: 32.2 MG/DL (ref 8–23)
CALCIUM SERPL-MCNC: 9.7 MG/DL (ref 8.2–9.6)
CHLORIDE SERPL-SCNC: 103 MMOL/L (ref 98–107)
CREAT SERPL-MCNC: 1.38 MG/DL (ref 0.51–0.95)
DEPRECATED HCO3 PLAS-SCNC: 22 MMOL/L (ref 22–29)
EGFRCR SERPLBLD CKD-EPI 2021: 34 ML/MIN/1.73M2
ERYTHROCYTE [DISTWIDTH] IN BLOOD BY AUTOMATED COUNT: 13.9 % (ref 10–15)
GLUCOSE SERPL-MCNC: 86 MG/DL (ref 70–99)
HCT VFR BLD AUTO: 32 % (ref 35–47)
HGB BLD-MCNC: 10.7 G/DL (ref 11.7–15.7)
MCH RBC QN AUTO: 32.4 PG (ref 26.5–33)
MCHC RBC AUTO-ENTMCNC: 33.4 G/DL (ref 31.5–36.5)
MCV RBC AUTO: 97 FL (ref 78–100)
PLATELET # BLD AUTO: 215 10E3/UL (ref 150–450)
POTASSIUM SERPL-SCNC: 3.4 MMOL/L (ref 3.4–5.3)
POTASSIUM SERPL-SCNC: 4.3 MMOL/L (ref 3.4–5.3)
RBC # BLD AUTO: 3.3 10E6/UL (ref 3.8–5.2)
SODIUM SERPL-SCNC: 138 MMOL/L (ref 135–145)
WBC # BLD AUTO: 5.3 10E3/UL (ref 4–11)

## 2024-04-24 PROCEDURE — 80048 BASIC METABOLIC PNL TOTAL CA: CPT | Performed by: NURSE PRACTITIONER

## 2024-04-24 PROCEDURE — 36415 COLL VENOUS BLD VENIPUNCTURE: CPT | Performed by: NURSE PRACTITIONER

## 2024-04-24 PROCEDURE — 250N000013 HC RX MED GY IP 250 OP 250 PS 637: Performed by: PHYSICIAN ASSISTANT

## 2024-04-24 PROCEDURE — 99232 SBSQ HOSP IP/OBS MODERATE 35: CPT | Performed by: NURSE PRACTITIONER

## 2024-04-24 PROCEDURE — 85027 COMPLETE CBC AUTOMATED: CPT | Performed by: NURSE PRACTITIONER

## 2024-04-24 PROCEDURE — 84132 ASSAY OF SERUM POTASSIUM: CPT | Performed by: NURSE PRACTITIONER

## 2024-04-24 PROCEDURE — 97116 GAIT TRAINING THERAPY: CPT | Mod: GP

## 2024-04-24 PROCEDURE — 120N000001 HC R&B MED SURG/OB

## 2024-04-24 PROCEDURE — 97530 THERAPEUTIC ACTIVITIES: CPT | Mod: GP

## 2024-04-24 PROCEDURE — 250N000013 HC RX MED GY IP 250 OP 250 PS 637: Performed by: NURSE PRACTITIONER

## 2024-04-24 PROCEDURE — 258N000003 HC RX IP 258 OP 636: Performed by: NURSE PRACTITIONER

## 2024-04-24 RX ORDER — POTASSIUM CHLORIDE 1500 MG/1
20 TABLET, EXTENDED RELEASE ORAL ONCE
Status: COMPLETED | OUTPATIENT
Start: 2024-04-24 | End: 2024-04-24

## 2024-04-24 RX ADMIN — CIPROFLOXACIN HYDROCHLORIDE 250 MG: 250 TABLET, FILM COATED ORAL at 09:42

## 2024-04-24 RX ADMIN — SODIUM CHLORIDE: 9 INJECTION, SOLUTION INTRAVENOUS at 10:48

## 2024-04-24 RX ADMIN — ACETAMINOPHEN 1000 MG: 500 TABLET, FILM COATED ORAL at 09:42

## 2024-04-24 RX ADMIN — POTASSIUM CHLORIDE 20 MEQ: 1500 TABLET, EXTENDED RELEASE ORAL at 09:42

## 2024-04-24 RX ADMIN — PANTOPRAZOLE SODIUM 40 MG: 40 TABLET, DELAYED RELEASE ORAL at 09:42

## 2024-04-24 RX ADMIN — ACETAMINOPHEN 1000 MG: 500 TABLET, FILM COATED ORAL at 14:40

## 2024-04-24 RX ADMIN — DILTIAZEM HYDROCHLORIDE 240 MG: 240 CAPSULE, COATED, EXTENDED RELEASE ORAL at 09:42

## 2024-04-24 RX ADMIN — ACETAMINOPHEN 1000 MG: 500 TABLET, FILM COATED ORAL at 20:33

## 2024-04-24 ASSESSMENT — ACTIVITIES OF DAILY LIVING (ADL)
ADLS_ACUITY_SCORE: 38
ADLS_ACUITY_SCORE: 36
ADLS_ACUITY_SCORE: 43
ADLS_ACUITY_SCORE: 36
ADLS_ACUITY_SCORE: 43
ADLS_ACUITY_SCORE: 43
ADLS_ACUITY_SCORE: 38
ADLS_ACUITY_SCORE: 43
ADLS_ACUITY_SCORE: 38
ADLS_ACUITY_SCORE: 43
ADLS_ACUITY_SCORE: 36
ADLS_ACUITY_SCORE: 43
ADLS_ACUITY_SCORE: 38
ADLS_ACUITY_SCORE: 38
ADLS_ACUITY_SCORE: 43
ADLS_ACUITY_SCORE: 43
ADLS_ACUITY_SCORE: 38
ADLS_ACUITY_SCORE: 43
ADLS_ACUITY_SCORE: 43
ADLS_ACUITY_SCORE: 38
ADLS_ACUITY_SCORE: 43

## 2024-04-24 NOTE — PROGRESS NOTES
Care Management Follow Up    Length of Stay (days): 1    Expected Discharge Date: 04/25/2024     Concerns to be Addressed: discharge planning     Patient plan of care discussed at interdisciplinary rounds: Yes    Anticipated Discharge Disposition:  return to [facility]     Additional Information:  CM following for discharge planning, met with pt and larry Schneider at bedside to discuss. Reviewed current PT recommendations for discharge back to [facility] with CGA w/ [facility] for mobility + HC PT. They are agreeable to these recommendations and will plan to increase pt's services to this level of care at [facility]. They would prefer this plan over discharge to TCU.     Spoke with Jackeline [facility] Clinical Director Lana P:[phone] F:[phone], ok with plan as above. Per provider, pt will likely be ready for discharge on 4/25. [facility] is able to accept back on Thursday 4/25 between 11am-5pm. Family planning to transport.     [facility] is able to accept for HC PT as [facility] is unable accept at this time.     Update 1540: Family would like WC ride at discharge. Reviewed out of pocket cost for [facility] WC transport, $89.98 base and $5.79 per mile to the destination. Spoke with larry Schneider, they expressed understanding and are agreeable to this.       Melody Rothman RN BSN   Inpatient Care Coordination  [facility]   Phone [phone]

## 2024-04-24 NOTE — PLAN OF CARE
/47 (BP Location: Left arm)   Pulse 74   Temp 97.9  F (36.6  C)   Resp 18   Wt 42 kg (92 lb 9.5 oz)   SpO2 96%   BMI 16.94 kg/m    PRIMARY DIAGNOSIS: Acute Encephalopathy  OUTPATIENT/OBSERVATION GOALS TO BE MET BEFORE DISCHARGE:  ADLs back to baseline: Yes    Activity and level of assistance: Up with standby assistance.    Pain status: Pain free.    Return to near baseline physical activity: Yes     Discharge Planner Nurse   Safe discharge environment identified: Yes  Barriers to discharge: Yes, BMP in AM with plan to discharge potentially tomorrow back to home ([facility])       Entered by: Sahra Nielsen RN 04/24/2024 6:17 PM   Patient is A&O to self and situation, able to make needs known when asked. Patient able to ambulate Ax1 with walker and gaitbelt. Ambulated this morning skilled nursing down tong and back with PT. Ambulates to the bathroom with SBA for cueing and IV pole assistance. NS infusing at 75ml/hr. Potassium and BMP recheck in AM, on oral Ciprofloxacin q24hrs. Goal to discharge tomorrow back to [facility] with HHPT, family to transport.  Please review provider order for any additional goals.   Nurse to notify provider when observation goals have been met and patient is ready for discharge.  Problem: Adult Inpatient Plan of Care  Goal: Plan of Care Review  Description: The Plan of Care Review/Shift note should be completed every shift.  The Outcome Evaluation is a brief statement about your assessment that the patient is improving, declining, or no change.  This information will be displayed automatically on your shift  note.  4/24/2024 1815 by Sahra Nielsen, RN  Outcome: Progressing  Flowsheets (Taken 4/24/2024 1815)  Outcome Evaluation:   Patient up in chair, able to communicate needs - Goal to discharge back to [facility] tomorrow   NS infusing at 75ml/hr  Plan of Care Reviewed With:   patient   child  Overall Patient Progress: improving  4/24/2024 1332 by Sahra Nielsen,  "RN  Outcome: Progressing  Flowsheets (Taken 4/24/2024 1000)  Outcome Evaluation: Patient up in chair and alert. Patient received Potassium replacement of 20 PO today. CM awaiting update from patient's correction if they can meet her needs at this time.  Plan of Care Reviewed With: patient  Overall Patient Progress: improving  Goal: Patient-Specific Goal (Individualized)  Description: You can add care plan individualizations to a care plan. Examples of Individualization might be:  \"Parent requests to be called daily at 9am for status\", \"I have a hard time hearing out of my right ear\", or \"Do not touch me to wake me up as it startles  me\".  4/24/2024 1815 by Sahra Nielsen, RN  Outcome: Progressing  4/24/2024 1332 by Sahra Nielsen, RN  Outcome: Progressing  Goal: Absence of Hospital-Acquired Illness or Injury  4/24/2024 1815 by Sahra Nielsen, RN  Outcome: Progressing  4/24/2024 1332 by Sahra Nielsen, RN  Outcome: Progressing  Intervention: Identify and Manage Fall Risk  Recent Flowsheet Documentation  Taken 4/24/2024 1800 by Sahra Nielsen, RN  Safety Promotion/Fall Prevention:   clutter free environment maintained   safety round/check completed  Taken 4/24/2024 1000 by Sahra Nielsen RN  Safety Promotion/Fall Prevention:   clutter free environment maintained   safety round/check completed  Intervention: Prevent and Manage VTE (Venous Thromboembolism) Risk  Recent Flowsheet Documentation  Taken 4/24/2024 1800 by Sahra Nielsen, RN  VTE Prevention/Management: SCDs (sequential compression devices) off  Taken 4/24/2024 1000 by Sahra Nielsen RN  VTE Prevention/Management: SCDs (sequential compression devices) off  Intervention: Prevent Infection  Recent Flowsheet Documentation  Taken 4/24/2024 1800 by Sahra Nielsen, RN  Infection Prevention:   single patient room provided   rest/sleep promoted   equipment surfaces disinfected   hand hygiene promoted  Taken 4/24/2024 1000 by " Sahra Nielsen, RN  Infection Prevention:   single patient room provided   rest/sleep promoted   equipment surfaces disinfected   hand hygiene promoted  Goal: Optimal Comfort and Wellbeing  4/24/2024 1815 by Sahra Nielsen RN  Outcome: Progressing  4/24/2024 1332 by Sahra Nielsen RN  Outcome: Progressing  Goal: Readiness for Transition of Care  4/24/2024 1815 by Sahra Nielsen RN  Outcome: Progressing  4/24/2024 1332 by Sahra Nielsen RN  Outcome: Progressing     Problem: Fall Injury Risk  Goal: Absence of Fall and Fall-Related Injury  4/24/2024 1815 by Sahra Nielsen RN  Outcome: Progressing  4/24/2024 1332 by Sahra Nielsen RN  Outcome: Progressing  Intervention: Identify and Manage Contributors  Recent Flowsheet Documentation  Taken 4/24/2024 1800 by Sahra Nielsen, RN  Medication Review/Management: medications reviewed  Intervention: Promote Injury-Free Environment  Recent Flowsheet Documentation  Taken 4/24/2024 1800 by Sahra Nielsen, RN  Safety Promotion/Fall Prevention:   clutter free environment maintained   safety round/check completed  Taken 4/24/2024 1000 by Sahra Nielsen, RN  Safety Promotion/Fall Prevention:   clutter free environment maintained   safety round/check completed     Goal Outcome Evaluation:      Plan of Care Reviewed With: patient, child    Overall Patient Progress: improvingOverall Patient Progress: improving    Outcome Evaluation: Patient up in chair, able to communicate needs - Goal to discharge back to [facility] tomorrow; NS infusing at 75ml/hr

## 2024-04-24 NOTE — PROGRESS NOTES
"BRIEF NUTRITION ASSESSMENT    REASON FOR ASSESSMENT:  Michelle Thomas is a 89 year old female seen by Registered Dietitian for Admission Nutrition Risk Screen for answering \"yes\" to recently losing weight without trying (2-13#) and \"yes\" to recently eating poorly d/t a decrease in appetite.    NUTRITION HISTORY:  - Spoke with patient and patient's daughter at bedside. Per daughter, pt has a low appetite at baseline (suspect w/ age). Per patient, eating is going fine and her weight has been stable recently. Hasn't had a decrease in appetite. Encouraged adequate intakes. Will send 1 Ensure Enlive.    CURRENT DIET AND INTAKE:  Diet:  Regular Diet              - No intakes documented per flowsheets    ANTHROPOMETRICS:  Height: 5'2\"  Weight: 42 kg, 92 lbs 9.49 oz  Body mass index is 16.94 kg/m .   Weight Status: Underweight BMI <18.5  IBW:  50 kg  %IBW: 84%  Weight History: weight seems pretty stable around 42-44 kg in the past year.  Wt Readings from Last 10 Encounters:   04/23/24 42 kg (92 lb 9.5 oz)   10/16/23 44 kg (97 lb)   12/19/16 58.1 kg (128 lb)   08/26/16 59 kg (130 lb)   08/02/16 57.6 kg (127 lb)   07/13/16 58.7 kg (129 lb 4.8 oz)   06/28/16 58.4 kg (128 lb 11.2 oz)   06/14/16 60.3 kg (133 lb)   06/13/16 59.9 kg (132 lb)   06/09/16 61 kg (134 lb 8 oz)     Per Care Everywhere:  41.7 kg (92 lb) 03/29/2024   44.5 kg (98 lb) 10/23/2023   43.5 kg (96 lb) 06/28/2023     LABS:  Labs noted    MALNUTRITION:  Patient does not meet two of the following criteria necessary for diagnosing malnutrition.     NUTRITION INTERVENTION:  Nutrition Diagnosis:  No nutrition diagnosis at this time.    Implementation:  Nutrition Education:  Per Provider order if indicated  Medical Food Supplement - ordered Ensure Enlive w/ breakfast    FOLLOW UP/MONITORING:   Will re-evaluate in 7 - 10 days, or sooner, if re-consulted.    Phoebe Alvarado RD, LD  Clinical Dietitian - [facility]  "

## 2024-04-24 NOTE — PLAN OF CARE
"Goal Outcome Evaluation:  Care from [id number]    Inpatient Progress Note:  For complete assessment see flow sheet documentation.    /58 (BP Location: Left arm)   Pulse 79   Temp 97.3  F (36.3  C) (Oral)   Resp 18   Wt 42 kg (92 lb 9.5 oz)   SpO2 96%   BMI 16.94 kg/m     Alert & oriented  with some forgetfulness. VSS. Denies pain. Assist x 1 with walker/GB.  On ABX rocephin.  NS infusing. Tolerating regular diet. Pt has not voided. Bladder scan 121. Will continue to encourage fluids and plan of care.    Will continue to monitor and provide cares.     Jessica Reyna RN     Problem: Adult Inpatient Plan of Care  Goal: Plan of Care Review  Description: The Plan of Care Review/Shift note should be completed every shift.  The Outcome Evaluation is a brief statement about your assessment that the patient is improving, declining, or no change.  This information will be displayed automatically on your shift  note.  Outcome: Progressing  Flowsheets (Taken 4/24/2024 0642)  Plan of Care Reviewed With: patient  Overall Patient Progress: improving  Goal: Patient-Specific Goal (Individualized)  Description: You can add care plan individualizations to a care plan. Examples of Individualization might be:  \"Parent requests to be called daily at 9am for status\", \"I have a hard time hearing out of my right ear\", or \"Do not touch me to wake me up as it startles  me\".  Outcome: Progressing  Goal: Absence of Hospital-Acquired Illness or Injury  Outcome: Progressing  Goal: Optimal Comfort and Wellbeing  Outcome: Progressing  Goal: Readiness for Transition of Care  Outcome: Progressing  Flowsheets (Taken 4/24/2024 0642)  Anticipated Changes Related to Illness: none  Transportation Anticipated: family or friend will provide  Concerns to be Addressed: discharge planning  Intervention: Mutually Develop Transition Plan  Recent Flowsheet Documentation  Taken 4/24/2024 0642 by Jessica Reyna, RN  Anticipated Changes Related to " Illness: none  Transportation Anticipated: family or friend will provide  Concerns to be Addressed: discharge planning         Plan of Care Reviewed With: patient    Overall Patient Progress: improvingOverall Patient Progress: improving

## 2024-04-24 NOTE — PLAN OF CARE
/76 (BP Location: Left arm)   Pulse 90   Temp 97.2  F (36.2  C) (Oral)   Resp 16   Wt 42 kg (92 lb 9.5 oz)   SpO2 97%   BMI 16.94 kg/m    PRIMARY DIAGNOSIS: Acute Encephalopathy, Hypercalcemia, Lung infiltrate, unwitnessed fall  OUTPATIENT/OBSERVATION GOALS TO BE MET BEFORE DISCHARGE:  ADLs back to baseline: Yes    Activity and level of assistance: Ax1 with GB and walker    Pain status: Pain free.    Return to near baseline physical activity: Yes     Discharge Planner Nurse   Safe discharge environment identified: Yes  Barriers to discharge: Yes, [facility] awaiting update if facility can take her back as Ax1 with walker       Entered by: Sahra Nielsen RN 04/24/2024 1:34 PM    Patient is A&O to self and situation, able to make needs known when asked. Patient able to ambulate Ax1 with walker and gaitbelt. Ambulated this morning senior living down tong and back with PT. Gissel Eid called to discuss code status change, writer updated family that this would need to be a family discussion since patient is listed as her own responsible party. NS infusing at 75ml/hr. Potassium at 3.4 this am - 20meq replaced with lab recheck at 4.3 with lab recheck in AM. On oral Ciprofloxacin q24hrs. Plan: Awaiting to hear from Critical access hospital if they can meet patient's needs at this time.   Please review provider order for any additional goals.   Nurse to notify provider when observation goals have been met and patient is ready for discharge.  Problem: Adult Inpatient Plan of Care  Goal: Plan of Care Review  Description: The Plan of Care Review/Shift note should be completed every shift.  The Outcome Evaluation is a brief statement about your assessment that the patient is improving, declining, or no change.  This information will be displayed automatically on your shift  note.  Outcome: Progressing  Flowsheets (Taken 4/24/2024 1000)  Outcome Evaluation: Patient up in chair and alert. Patient received Potassium  "replacement of 20 PO today. CM awaiting update from patient's [facility] if they can meet her needs at this time.  Plan of Care Reviewed With: patient  Overall Patient Progress: improving  Goal: Patient-Specific Goal (Individualized)  Description: You can add care plan individualizations to a care plan. Examples of Individualization might be:  \"Parent requests to be called daily at 9am for status\", \"I have a hard time hearing out of my right ear\", or \"Do not touch me to wake me up as it startles  me\".  Outcome: Progressing  Goal: Absence of Hospital-Acquired Illness or Injury  Outcome: Progressing  Intervention: Identify and Manage Fall Risk  Recent Flowsheet Documentation  Taken 4/24/2024 1000 by Sahra Nielsen, RN  Safety Promotion/Fall Prevention:   clutter free environment maintained   safety round/check completed  Intervention: Prevent and Manage VTE (Venous Thromboembolism) Risk  Recent Flowsheet Documentation  Taken 4/24/2024 1000 by Sahra Nielsen, RN  VTE Prevention/Management: SCDs (sequential compression devices) off  Intervention: Prevent Infection  Recent Flowsheet Documentation  Taken 4/24/2024 1000 by Sahra Nielsen, RN  Infection Prevention:   single patient room provided   rest/sleep promoted   equipment surfaces disinfected   hand hygiene promoted  Goal: Optimal Comfort and Wellbeing  Outcome: Progressing  Goal: Readiness for Transition of Care  Outcome: Progressing     Problem: Fall Injury Risk  Goal: Absence of Fall and Fall-Related Injury  Outcome: Progressing  Intervention: Promote Injury-Free Environment  Recent Flowsheet Documentation  Taken 4/24/2024 1000 by Sahra Nielsen, RN  Safety Promotion/Fall Prevention:   clutter free environment maintained   safety round/check completed     Goal Outcome Evaluation:      Plan of Care Reviewed With: patient    Overall Patient Progress: improvingOverall Patient Progress: improving    Outcome Evaluation: Patient up in chair and alert. " Patient received Potassium replacement of 20 PO today. CM awaiting update from patient's custodial if they can meet her needs at this time.

## 2024-04-24 NOTE — PROGRESS NOTES
[facility] [place]    Medicine Progress Note - Hospitalist Service    Date of Admission:  4/22/2024  HD #1     Assessment & Plan     Michelle Thomas is a 89 year old female with PMHx significant for HTN, hx of SVT, HLP, CKD, asthma, osteoporosis and fairly recent T8 compression fx, who was admitted on 4/22/2024 with acute encephalopathy. She has had 2 falls in the past 2 days.     She is resting comfortably sitting in bedside chair.  Dtr and son (2 of 6 children present -- Jennie and Kalpesh).  Updates given.  Still confused.     #Acute encephalopathy -- metabolic   #Possible UTI  Family notes increased confusion from baseline since Friday, 4/19. No focal neurological deficits.   Etiology unclear, no obvious infection noted. UA shows negative LE, negative nitrites with 6 WBCs and bacteria. She does report dysuria. CT abd/pelvis does not show anything distinctly acute, mild ground glass opacity in RLL, mild gallbladder distention with mod intrahepatic and extra hepatic biliary ductal dilation, atrophic R kidney due to long standing obstruction.   CT head, CT cervical and lumbar spine negative for acute process or fx.  CMP stable, Cr likely at baseline at 1.24 (1.0-1.2), BUN mildly elevated at 31, alk phos 160, LFTs otherwise negative. Ca elevated at 13.3, ionized Ca 6.5. Procalcitonin 0.13. CBC unremarkable.     Suspect encephalopathy is multifactorial possible UTI vs hypercalcemia. Pt endorses dysuria but UC is pending. No abdominal pain, nausea or vomiting. No abd tenderness on exam, low suspicion for biliary obstruction. Sleep deprivation and constipation could contribute. Ca is elevated, pt was recently started on Tymlos injections for osteoporosis. Opacity noted on CXR, denies cough or SOB, she is not hypoxic or tachycardic to support respiratory infection.     - admitted to OBS but changed to inpatient based on acuity, increased needs/care.  - given IVF overnight with improvement in Ca but still not  drinking enough on own. Can saline lock when taking PO well.   - repeat BMP and CBC in the AM   - hold Tymlos and Ca supplement  - UC NGTD. Started Cipro has received it in the past without issue.  Complete 3 day course (although culture negative she appears to be responding better today).   - PT/ SW consult      #Hypokalemia  #Hypercalcemia  #Chronic T8 compression fx  Ca 13.3 with ionized Ca 6.5. pt recently started on daily Tymlos injections for osteoporosis and to help T8 compression fx heal. Also on Vitamin C and D supplementation.    - hold supplementation and Tymlos  - replete K  - repeat BMP in the AM.   - continue TID Tylenol     #HTN  #Hx of SVT    - continue home Diltiazem    #HLP    - hold statin while OBS    #CKD stage III  Cr 1.24-1.38 here, likely his baseline. Baseline appears to fluctuate between 1.0-1.2    - saline lock when taking PO well.   - repeat BMP in the AM.     #Asthma  lungs quiescent.   Managed with PRN albuterol inhaler         Diet:  regular diet  DVT Prophylaxis: Pneumatic Compression Devices  Portillo Catheter: Not present  Lines: None     Cardiac Monitoring: None  Code Status:  Full code. Discussed with pt as she was previously DNR. She is adamant that she wants to be full code to make it to 100. Explained likely poor outcome if pt was resuscitated but she stood firm in her wishes. Family would like to honor her wishes.    Clinically Significant Risk Factors        # Hypokalemia: Lowest K = 3.1 mmol/L in last 2 days, will replace as needed    # Hypercalcemia: Highest Ca = 10.7 mg/dL in last 2 days, will monitor as appropriate                          Disposition Plan     Medically Ready for Discharge: Anticipated Tomorrow           The patient's care was discussed with the Bedside Nurse, Care Coordinator/, Patient, and Patient's Family.    ALINA [facility]  Hospitalist Service  [facility]  Securely message with [employer] (more info)  Text  page via [facility] Paging/Directory   ______________________________________________________________________    Interval History   Much improved.  No CP or SOB.  Denies any new complaints.  Not as altered.    Likely discharge in the AM.     Physical Exam   Vital Signs: Temp: 97.2  F (36.2  C) Temp src: Oral BP: 138/76 Pulse: 90   Resp: 16 SpO2: 97 % O2 Device: None (Room air)    Weight: 92 lbs 9.49 oz    GEN:  Alert, oriented x 3, appears comfortable, NAD.  NECK:   Supple ,no mass or thyromegaly   HEENT:  Normocephalic/atraumatic, no scleral icterus, no nasal discharge, mouth moist.  CV:   Regular rate and rhythm, no murmur or JVD.  S1 + S2 noted, no S3 or S4.  LUNGS:   Clear to auscultation bilaterally without rales/rhonchi/wheezing/retractions.  Symmetric chest rise on inhalation noted.  ABD:   Active bowel sounds, soft, non-tender/non-distended.  No rebound/guarding/rigidity.  EXT:   No edema.  No cyanosis.  No joint synovitis noted.  SKIN:   Dry to touch, no exanthems noted in the visualized areas.  Neurologic: Grossly intact,non focal.       Medical Decision Making       45 MINUTES SPENT BY ME on the date of service doing chart review, history, exam, documentation & further activities per the note.      Data   ------------------------- PAST 24 HR DATA REVIEWED -----------------------------------------------    I have personally reviewed the following data over the past 24 hrs:    5.3  \   10.7 (L)   / 215     138 103 32.2 (H) /  86   4.3 22 1.38 (H) \       Imaging results reviewed over the past 24 hrs:   No results found for this or any previous visit (from the past 24 hour(s)).

## 2024-04-25 VITALS
TEMPERATURE: 97.4 F | HEART RATE: 73 BPM | BODY MASS INDEX: 16.94 KG/M2 | OXYGEN SATURATION: 98 % | WEIGHT: 92.59 LBS | SYSTOLIC BLOOD PRESSURE: 124 MMHG | RESPIRATION RATE: 16 BRPM | DIASTOLIC BLOOD PRESSURE: 54 MMHG

## 2024-04-25 LAB
ANION GAP SERPL CALCULATED.3IONS-SCNC: 9 MMOL/L (ref 7–15)
BUN SERPL-MCNC: 38.1 MG/DL (ref 8–23)
CALCIUM SERPL-MCNC: 9.7 MG/DL (ref 8.2–9.6)
CHLORIDE SERPL-SCNC: 104 MMOL/L (ref 98–107)
CREAT SERPL-MCNC: 1.31 MG/DL (ref 0.51–0.95)
DEPRECATED HCO3 PLAS-SCNC: 24 MMOL/L (ref 22–29)
EGFRCR SERPLBLD CKD-EPI 2021: 37 ML/MIN/1.73M2
GLUCOSE SERPL-MCNC: 85 MG/DL (ref 70–99)
POTASSIUM SERPL-SCNC: 3.7 MMOL/L (ref 3.4–5.3)
SODIUM SERPL-SCNC: 137 MMOL/L (ref 135–145)

## 2024-04-25 PROCEDURE — 99239 HOSP IP/OBS DSCHRG MGMT >30: CPT | Performed by: NURSE PRACTITIONER

## 2024-04-25 PROCEDURE — 250N000013 HC RX MED GY IP 250 OP 250 PS 637: Performed by: PHYSICIAN ASSISTANT

## 2024-04-25 PROCEDURE — 258N000003 HC RX IP 258 OP 636: Performed by: NURSE PRACTITIONER

## 2024-04-25 PROCEDURE — 80048 BASIC METABOLIC PNL TOTAL CA: CPT | Performed by: NURSE PRACTITIONER

## 2024-04-25 PROCEDURE — 36415 COLL VENOUS BLD VENIPUNCTURE: CPT | Performed by: NURSE PRACTITIONER

## 2024-04-25 PROCEDURE — 250N000013 HC RX MED GY IP 250 OP 250 PS 637: Performed by: NURSE PRACTITIONER

## 2024-04-25 RX ADMIN — PANTOPRAZOLE SODIUM 40 MG: 40 TABLET, DELAYED RELEASE ORAL at 08:51

## 2024-04-25 RX ADMIN — SODIUM CHLORIDE: 9 INJECTION, SOLUTION INTRAVENOUS at 08:54

## 2024-04-25 RX ADMIN — ACETAMINOPHEN 1000 MG: 500 TABLET, FILM COATED ORAL at 08:51

## 2024-04-25 RX ADMIN — CIPROFLOXACIN HYDROCHLORIDE 250 MG: 250 TABLET, FILM COATED ORAL at 08:51

## 2024-04-25 RX ADMIN — DILTIAZEM HYDROCHLORIDE 240 MG: 240 CAPSULE, COATED, EXTENDED RELEASE ORAL at 08:51

## 2024-04-25 ASSESSMENT — ACTIVITIES OF DAILY LIVING (ADL)
ADLS_ACUITY_SCORE: 43
ADLS_ACUITY_SCORE: 35
ADLS_ACUITY_SCORE: 43
ADLS_ACUITY_SCORE: 39
ADLS_ACUITY_SCORE: 35
ADLS_ACUITY_SCORE: 43
ADLS_ACUITY_SCORE: 35
ADLS_ACUITY_SCORE: 35
ADLS_ACUITY_SCORE: 39
ADLS_ACUITY_SCORE: 43
ADLS_ACUITY_SCORE: 39
ADLS_ACUITY_SCORE: 39

## 2024-04-25 NOTE — PLAN OF CARE
"Patient's After Visit Summary was reviewed with patient and/or DAUGHTER.   Patient verbalized understanding of After Visit Summary, recommended follow up and was given an opportunity to ask questions.   Discharge medications sent home with patient/family: No   Discharged with transport tech          Problem: Adult Inpatient Plan of Care  Goal: Plan of Care Review  Description: The Plan of Care Review/Shift note should be completed every shift.  The Outcome Evaluation is a brief statement about your assessment that the patient is improving, declining, or no change.  This information will be displayed automatically on your shift  note.  4/25/2024 1215 by Lzibet Crowder RN  Outcome: Met  Flowsheets (Taken 4/25/2024 1215)  Outcome Evaluation: Plan to discharge home with home care  Plan of Care Reviewed With: patient  Overall Patient Progress: improving  4/25/2024 0926 by Lizbet Crowder RN  Outcome: Progressing  Flowsheets (Taken 4/25/2024 0926)  Outcome Evaluation: Plan to discharge home with home care.  Plan of Care Reviewed With: patient  Overall Patient Progress: improving  Goal: Patient-Specific Goal (Individualized)  Description: You can add care plan individualizations to a care plan. Examples of Individualization might be:  \"Parent requests to be called daily at 9am for status\", \"I have a hard time hearing out of my right ear\", or \"Do not touch me to wake me up as it startles  me\".  4/25/2024 1215 by Lizbet Crowder RN  Outcome: Met  4/25/2024 0926 by Lizbet Crowder RN  Outcome: Progressing  Goal: Absence of Hospital-Acquired Illness or Injury  4/25/2024 1215 by Lizbet Crowder RN  Outcome: Met  4/25/2024 0926 by Lizbet Crowder, RN  Outcome: Progressing  Intervention: Identify and Manage Fall Risk  Recent Flowsheet Documentation  Taken 4/25/2024 1006 by Lizbet Crowder, RN  Safety Promotion/Fall Prevention:   activity supervised   nonskid shoes/slippers when out of bed  Intervention: Prevent " Infection  Recent Flowsheet Documentation  Taken 4/25/2024 1006 by Lizbet Crowder RN  Infection Prevention: hand hygiene promoted  Goal: Optimal Comfort and Wellbeing  4/25/2024 1215 by Lizbet Crowder RN  Outcome: Met  4/25/2024 0926 by Lizbet Crowder RN  Outcome: Progressing  Goal: Readiness for Transition of Care  4/25/2024 1215 by Lizbet Crowder RN  Outcome: Met  4/25/2024 0926 by Lizbet Crowder RN  Outcome: Progressing     Problem: Fall Injury Risk  Goal: Absence of Fall and Fall-Related Injury  4/25/2024 1215 by Lizbet Crowder RN  Outcome: Met  4/25/2024 0926 by Lizbet Crowder RN  Outcome: Progressing  Intervention: Promote Injury-Free Environment  Recent Flowsheet Documentation  Taken 4/25/2024 1006 by Lizbet Crowder RN  Safety Promotion/Fall Prevention:   activity supervised   nonskid shoes/slippers when out of bed

## 2024-04-25 NOTE — PLAN OF CARE
"Shift from [id number]     Inpatient Progress Note:  For complete assessment see flow sheet documentation.     Orientation: AO x2, disoriented to time and place  Pain status: Denies  Activity: Up with A1 with walker, GB  Resp: WDL  Cardiac: WDl  GI: WDL  : occasionally incontinent  LDA: PIV in L forearm  Infusions: NS 75mL/hr    Diet: regular  Pertinent Labs: K  Safety: bed alarm on, call light within reach, bed in low position, room near nurses station  Consults: PT  Discharge Plan: discontinue back to [facility] today    Goal Outcome Evaluation:      Plan of Care Reviewed With: patient    Overall Patient Progress: improvingOverall Patient Progress: improving    Outcome Evaluation: Pt planning [facility]    Problem: Adult Inpatient Plan of Care  Goal: Plan of Care Review  Description: The Plan of Care Review/Shift note should be completed every shift.  The Outcome Evaluation is a brief statement about your assessment that the patient is improving, declining, or no change.  This information will be displayed automatically on your shift  note.  Outcome: Progressing  Flowsheets (Taken 4/25/2024 0304)  Outcome Evaluation: Pt planning on [facility]  Plan of Care Reviewed With: patient  Overall Patient Progress: improving  Goal: Patient-Specific Goal (Individualized)  Description: You can add care plan individualizations to a care plan. Examples of Individualization might be:  \"Parent requests to be called daily at 9am for status\", \"I have a hard time hearing out of my right ear\", or \"Do not touch me to wake me up as it startles  me\".  Outcome: Progressing  Goal: Absence of Hospital-Acquired Illness or Injury  Outcome: Progressing  Intervention: Identify and Manage Fall Risk  Recent Flowsheet Documentation  Taken 4/24/2024 2033 by Xiomara Turk, RN  Safety Promotion/Fall Prevention:   activity supervised   assistive device/personal items within reach   clutter " free environment maintained   increased rounding and observation   lighting adjusted   mobility aid in reach   nonskid shoes/slippers when out of bed   safety round/check completed  Intervention: Prevent Skin Injury  Recent Flowsheet Documentation  Taken 4/24/2024 2033 by Xiomara Turk RN  Body Position: position changed independently  Intervention: Prevent and Manage VTE (Venous Thromboembolism) Risk  Recent Flowsheet Documentation  Taken 4/24/2024 2033 by Xiomara Turk RN  VTE Prevention/Management: SCDs (sequential compression devices) off  Intervention: Prevent Infection  Recent Flowsheet Documentation  Taken 4/24/2024 2033 by Xiomara Turk RN  Infection Prevention:   single patient room provided   rest/sleep promoted   equipment surfaces disinfected   hand hygiene promoted  Goal: Optimal Comfort and Wellbeing  Outcome: Progressing  Goal: Readiness for Transition of Care  Outcome: Progressing     Problem: Fall Injury Risk  Goal: Absence of Fall and Fall-Related Injury  Outcome: Progressing  Intervention: Identify and Manage Contributors  Recent Flowsheet Documentation  Taken 4/24/2024 2033 by Xiomara Turk RN  Medication Review/Management: medications reviewed  Intervention: Promote Injury-Free Environment  Recent Flowsheet Documentation  Taken 4/24/2024 2033 by Xiomara Turk RN  Safety Promotion/Fall Prevention:   activity supervised   assistive device/personal items within reach   clutter free environment maintained   increased rounding and observation   lighting adjusted   mobility aid in reach   nonskid shoes/slippers when out of bed   safety round/check completed

## 2024-04-25 NOTE — PLAN OF CARE
"Dx: Acute encephalopathy    BP (!) 147/74 (BP Location: Left arm)   Pulse 78   Temp 97.3  F (36.3  C) (Oral)   Resp 16   Wt 42 kg (92 lb 9.5 oz)   SpO2 99%   BMI 16.94 kg/m       A&O x2. Assist x1 with walker. Regular diet tolerated. Reporting no pain at this time. NS @  75 LPM. K protocol ran. Discharge plan home with home care. Chair alarm on and call light within reach.       Problem: Adult Inpatient Plan of Care  Goal: Plan of Care Review  Description: The Plan of Care Review/Shift note should be completed every shift.  The Outcome Evaluation is a brief statement about your assessment that the patient is improving, declining, or no change.  This information will be displayed automatically on your shift  note.  Outcome: Progressing  Flowsheets (Taken 4/25/2024 0926)  Outcome Evaluation: Plan to discharge home with home care.  Plan of Care Reviewed With: patient  Overall Patient Progress: improving  Goal: Patient-Specific Goal (Individualized)  Description: You can add care plan individualizations to a care plan. Examples of Individualization might be:  \"Parent requests to be called daily at 9am for status\", \"I have a hard time hearing out of my right ear\", or \"Do not touch me to wake me up as it startles  me\".  Outcome: Progressing  Goal: Absence of Hospital-Acquired Illness or Injury  Outcome: Progressing  Goal: Optimal Comfort and Wellbeing  Outcome: Progressing  Goal: Readiness for Transition of Care  Outcome: Progressing     Problem: Fall Injury Risk  Goal: Absence of Fall and Fall-Related Injury  Outcome: Progressing     "

## 2024-04-25 NOTE — PROGRESS NOTES
Care Management Discharge Note    Discharge Date: 04/25/2024     Discharge Disposition: Home Care, Assisted Living    Discharge Services: None    Discharge DME: None    Discharge Transportation: family or friend will provide    Private pay costs discussed: transportation costs    Patient/family educated on Medicare website which has current facility and service quality ratings: yes    Education Provided on the Discharge Plan: Yes  Persons Notified of Discharge Plans: Pt and family   Patient/Family in Agreement with the Plan: yes    Additional Information:  CM following for discharge planning, per AM care rounds pt is medically ready to discharge back [facility] w/ HC PT through [facility].     [facility] ride arranged for today between [id number]. Called Clinical Director Lana P:[phone] to inform of plan and confirm, LM requesting call back. Will send orders to  F:[phone] once complete. Updated dtr Jennie and bedside RN.     Update 1030: Orders completed and faxed to F: [phone].     Update 1140: Spoke with [facility] clinical director Lana and confirmed the above plan.     Melody Rothman RN BSN   Inpatient Care Coordination  [facility]   Phone [phone]

## 2024-04-25 NOTE — DISCHARGE SUMMARY
[facility]  Hospitalist Discharge Summary      Date of Admission:  4/22/2024  Date of Discharge:  4/25/2024  Discharging Provider: ALINA Renae CNP  Discharge Service: Hospitalist Service    Discharge Diagnoses   See below    Clinically Significant Risk Factors          Follow-ups Needed After Discharge   Follow-up Appointments     Follow-up and recommended labs and tests       Follow up with primary care provider, Bhaskar Ramires, within 7 days   for hospital follow- up.  The following labs/tests are recommended: BMP,   CBC, and possibly UA depending on symptoms            Unresulted Labs Ordered in the Past 30 Days of this Admission       No orders found from 3/23/2024 to 4/23/2024.        These results will be followed up by NA    Discharge Disposition   Discharged to assisted living  Condition at discharge: Stable    Hospital Course   Per H and P:  Michelle Thomas is a 89 year old female with PMHx significant for HTN, hx of SVT, HLP, CKD, asthma, osteoporosis and fairly recent T8 compression fx, who presents to the ED after multiple falls.  Per staff at her nursing home, she had a fall yesterday but felt well so did not go to the hospital.  She fell again this morning while try to put on her pants.  She again denies any injury.  The family does note increased confusion since Friday.  The patient does not endorse dysuria and constipation.  The patient's family states that she complained of diarrhea on Saturday.  She otherwise has been feeling well lately.  The family notes that she has poor oral intake at baseline.  Patient denies fever, chills, chest pain, shortness of breath, Sam pain, nausea, or vomiting.  She does have a history of fairly recent T8 compression fracture and she was seen by endocrinology and started on Tymlos injections daily to help her fracture heal.  She is also on a calcium and vitamin D supplement.  She was treated for a UTI at the end of March  and was treated with prednisone for sinus congestion in early April.  The daughter states her mental status remained normal while taking these medications.  The family does report that she had a poor night of sleep.    Hospital Course Summary:  Diagnoses:  Acute Encephalopathy (metabolic): The patient experienced increased confusion. The cause is unclear, with no specific infection identified. We suspect the encephalopathy may be due to a possible UTI or hypercalcemia. The patient was initially admitted for observation but was changed to inpatient due to increased care needs.  Possible UTI: Patient reported discomfort during urination, but urinalysis showed no clear evidence of infection.  Falls:     Treatment:  Provided IV fluids for a couple of days which improved calcium levels. Calcium peaked at 13.3 and down trended to 9.7 at time of discharge.  Temporarily stop Tymlos and calcium supplements. Follow up with PCP/Endocrine for continued discussion regarding risks/benefits.  Treated with Cipro for possible UTI, despite negative culture, due to patient's history and complaints with improved response. Treated 3 day course.   Consulted with physical therapy (PT) and  (SW). Adding home care with home PT at discharge.      Hypokalemia: The patient's potassium level was low with miky of 3.1 and 3.7 at time of discharge.  Hypercalcemia: The patient's calcium levels were high, possibly due to recent Tymlos injections for osteoporosis and vitamin C and D supplementation.Calcium peaked at 13.3 and down trended to 9.7 at time of discharge.    Treatment:  Replenish potassium.  Repeat BMP in 1 week.   Temporarily stop supplementation and Tymlos.      Chronic T8 Compression Fracture: The patient has a longstanding compressed vertebra, for which she recently started Tymlos injections.    Treatment:  Continue with regular Tylenol.      Hypertension (HTN) & History of Supraventricular Tachycardia (SVT): The patient  has a history of high blood pressure and rapid heart rate.    Treatment:  Continue home Diltiazem.    High Lipid Levels (HLP): The patient has high cholesterol levels.    Treatment:  Temporarily stop statin while under observation. May resume at discharge.     Chronic Kidney Disease (CKD) Stage III: The patient's creatinine levels are slightly high, but likely at her baseline. Baseline Scr 1.1-1.3    Treatment:  Discontinue IVF.  Repeat BMP in 1 week.    Asthma: The patient's lungs are stable.    Treatment:  Continue with albuterol inhaler as needed.       Consultations This Hospital Stay   CARE MANAGEMENT / SOCIAL WORK IP CONSULT  PHYSICAL THERAPY ADULT IP CONSULT    Code Status   Full Code    Time Spent on this Encounter   I, ALINA Renae CNP, personally saw the patient today and spent greater than 30 minutes discharging this patient.       ALINA Renae CNP  [facility] OBSERVATION DEPT  [address] [facility] [address]  Phone: [phone]  ______________________________________________________________________    Physical Exam   Vital Signs: Temp: 97.3  F (36.3  C) Temp src: Oral BP: (!) 147/74 Pulse: 78   Resp: 16 SpO2: 99 % O2 Device: None (Room air)    Weight: 92 lbs 9.49 oz    GENERAL:  Comfortable.  PSYCH: pleasant, oriented, No acute distress.  HEENT:  Atraumatic, normocephalic. Normal conjunctiva, normal hearing, and oropharynx is normal.  NECK:  Supple, no neck vein distention  HEART:  Normal S1, S2 with no murmur, no pericardial rub, gallops or S3 or S4.  LUNGS:  Clear to auscultation, normal Respiratory effort. No wheezing, rales or ronchi.  GI:  Soft, normal bowel sounds. Non-tender, non distended.   EXTREMITIES:  No pedal edema, +2 pulses bilateral and equal.  SKIN:  Dry to touch, No rash, wound or ulcerations.  NEUROLOGIC:  CN 2-12 intact, BL 5/5 symmetric upper and lower extremity strength, sensation is intact with no focal deficits.         Primary  Care Physician   Bhaskar Garcia [facility]    Discharge Orders      Home Care Referral      Follow-up and recommended labs and tests     Follow up with primary care provider, Bhaskar Ramires, within 7 days for hospital follow- up.  The following labs/tests are recommended: BMP, CBC, and possibly UA depending on symptoms     Activity    Your activity upon discharge: activity as tolerated     Reason for your hospital stay    Acute metabolic encephalopathy either secondary to UTI or mild hypercalcemia.  Possible UTI. Treated symptomatically with Cipro x 3 days.   Mild hypercalcemia likely secondary to decreased PO intake and possibly medications   Moderate dehydration.  Hypokalemia  T8 compression fracture  Hx HTN, SVT, and HLD      --------------------------------------------------------------    Discharge instructions:      Drink plenty of water: Staying hydrated is very important. It can help with your high calcium levels and overall health.    Be careful with your medications: You have been taking Tuxol and calcium supplements, which might be causing your high calcium levels. Let's take a break from these for now.    Watch out for UTI symptoms: You may have a urinary tract infection. This can cause symptoms like needing to pee a lot, pain when peeing, and cloudy or strong-smelling urine. If you notice these symptoms, let your doctor know right away.    Rest and take it easy: You were admitted because of acute encephalopathy, which means your brain was not working properly. This can make you feel tired or confused. Make sure to rest and not overdo it.    Follow up with your doctor: It's important to see your doctor soon after you leave the hospital. They will check on your progress and make sure your treatments are working.    Let us know if you feel worse: If your symptoms get worse or you don't feel well, get in touch with your doctor right away. Don't wait for your next appointment.    Medication changes: We have  made some changes to your medications. Make sure to follow the new instructions. If you have questions or problems with your medicine, let us know.    Ask for help: You live in an assisted living facility. Don't hesitate to ask the staff for help when you need it. They are there to support you.     Diet    Follow this diet upon discharge: Orders Placed This Encounter      Snacks/Supplements Adult: Ensure Enlive; With Meals      Regular Diet Adult    You need to be drinking at least 60 ounces of non carbonated, non caffeinated beverages daily.       Significant Results and Procedures   Most Recent 3 CBC's:  Recent Labs   Lab Test 04/24/24  0541 04/23/24  0608 04/22/24  1116   WBC 5.3 6.3 7.2   HGB 10.7* 11.8 11.9   MCV 97 98 99    250 241     Most Recent 3 BMP's:  Recent Labs   Lab Test 04/25/24  0538 04/24/24  1233 04/24/24  0541 04/23/24  1624 04/23/24  0608     --  138  --  142   POTASSIUM 3.7 4.3 3.4   < > 3.1*   CHLORIDE 104  --  103  --  104   CO2 24  --  22  --  24   BUN 38.1*  --  32.2*  --  23.8*   CR 1.31*  --  1.38*  --  1.10*   ANIONGAP 9  --  13  --  14   DEANA 9.7*  --  9.7*  --  10.7*   GLC 85  --  86  --  76    < > = values in this interval not displayed.     Most Recent 2 LFT's:  Recent Labs   Lab Test 04/22/24  1116 07/13/16  1103   AST 26 20   ALT 21 23   ALKPHOS 160* 82   BILITOTAL 0.6 0.8     7-Day Micro Results       Collected Updated Procedure Result Status      04/22/2024 1157 04/23/2024 1202 Urine Culture [[id number]]   Urine, Clean Catch    Final result Component Value   Culture No Growth                       Most Recent 6 glucoses:  Recent Labs   Lab Test 04/25/24  0538 04/24/24  0541 04/23/24  0608 04/22/24  1116 07/14/16  0740 07/13/16  1103   GLC 85 86 76 104* 94 126*     Most Recent Urinalysis:  Recent Labs   Lab Test 04/22/24  1157 08/26/16  1501   COLOR Straw Yellow   APPEARANCE Cloudy* Clear   URINEGLC Negative Negative   URINEBILI Negative Negative   URINEKETONE  Negative Negative   SG 1.009 1.015   UBLD Negative Small*   URINEPH 7.5* 5.5   PROTEIN Negative 100*   UROBILINOGEN  --  0.2   NITRITE Negative Negative   LEUKEST Negative Moderate*   RBCU 2  --    WBCU 6*  --    ,   Results for orders placed or performed during the hospital encounter of 04/22/24   Head CT w/o contrast    Narrative    CT SCAN OF THE HEAD WITHOUT CONTRAST April 22, 2024 12:44 PM     HISTORY: Fall, trauma.    TECHNIQUE: Axial images of the head and coronal reformations without  IV contrast material. Radiation dose for this scan was reduced using  automated exposure control, adjustment of the mA and/or kV according  to patient size, or iterative reconstruction technique.    COMPARISON: 10/16/2023.    FINDINGS: There is no evidence of intracranial hemorrhage, mass, acute  infarct or anomaly. The ventricles are normal in size, shape and  configuration. Mild diffuse parenchymal volume loss. Mild patchy  periventricular white matter hypodensities which are nonspecific, but  likely related to chronic microvascular ischemic disease.  Atherosclerotic calcification of the carotid siphons.    Bilateral lens implants. Mild to moderate polypoid mucosal thickening  in the left sphenoid sinus with minimal scattered paranasal sinus  mucosal thickening elsewhere. The mastoid and middle ear cavities are  free of significant disease. The bony calvarium and bones of the skull  base appear intact.       Impression    IMPRESSION:  1. No CT findings of acute intracranial process.  2. Brain atrophy and presumed chronic small vessel ischemic changes,  as described.    RICHARD ROGEL MD         SYSTEM ID:  [id number]   CT Cervical Spine w/o Contrast    Narrative    CT CERVICAL SPINE WITHOUT CONTRAST April 22, 2024 12:44 PM     HISTORY: Fall, trauma.     TECHNIQUE: Axial images of the cervical spine were obtained without  intravenous contrast. Multiplanar reformations were performed.  Radiation dose for this scan was reduced  using automated exposure  control, adjustment of the mA and/or kV according to patient size, or  iterative reconstruction technique.    COMPARISON: 10/16/2023.    FINDINGS: No acute cervical spine fracture is identified.  Anterolisthesis of C4 on C5 and C5 on C6 measuring up to approximately  3 mm. Minimal levoconvex curvature of the cervicothoracic junction.  Moderate to severe disc height loss at C5-C6. At least moderate disc  height loss C6-C7, with milder degrees of disc height loss elsewhere.  Scattered marginal endplate and uncovertebral spurs. There is solid  ankylosis across the right C5-C6 facet joint with multilevel  degenerative facet disease. Facet arthropathy appears moderate to  severe on the left at C2-C3, C3-C4 and C4-C5 and on the right at  C4-C5. Degenerative changes of the medial atlantoaxial joint.  Multilevel disc osteophyte complexes. No definite high-grade central  spinal canal stenosis identified. Mild/moderate degrees of  degenerative neural foraminal stenosis are noted.    Couple of subcentimeter hypodensities in the thyroid gland. Mild  scarring of the lung apices. Mild atherosclerotic calcification of the  carotid bifurcations.      Impression    IMPRESSION:  1. No acute cervical spine fracture.  2. Anterolisthesis of C4 on C5 and C5 on C6, unchanged.  3. Multilevel degenerative changes, as described.    RICHARD ROGEL MD         SYSTEM ID:  [id number]   CT Lumbar Spine w/o Contrast    Narrative    CT LUMBAR SPINE WITHOUT CONTRAST April 22, 2024 12:45 PM     HISTORY: Fall, trauma.      TECHNIQUE: Axial images of the lumbar spine were obtained without  intravenous contrast. Multiplanar reformations were performed.  Radiation dose for this scan was reduced using automated exposure  control, adjustment of the mA and/or kV according to patient size, or  iterative reconstruction technique.     COMPARISON: CT lumbar spine dated 10/16/2023. CT chest, abdomen and  pelvis 4/22/2024.     FINDINGS:  Nomenclature is based on five lumbar vertebral bodies. There  is diffuse osseous demineralization, limiting evaluation for fracture.  No definite acute lumbar spine fracture is identified. Vertebral body  heights appear within normal limits. Minimal anterolisthesis of L3 on  L4 and retrolisthesis of L4 on L5. Minimal anterolisthesis of L5 on  S1. There is mild left lateral listhesis of T12 on L1 and L1 on L2 and  mild to moderate right lateral listhesis of L3 on L4. Moderate  dextroconvex curvature centered at L2-L3 measuring approximately 34-35  degrees from the superior endplate of L1 to the inferior endplate of  L4.    Ankylosis across the L3-L4 disc space is noted. There is moderate to  severe disc space narrowing at L2-L3 and L4-L5. Lesser degrees of disc  space narrowing elsewhere. Scattered marginal endplate osteophytes.  Multilevel degenerative facet disease. Disc bulges with posterior  endplate osteophytic ridging. There appears to be at least moderate  central spinal canal stenosis at L3-L4 and relatively lesser degrees  of mild/mild to moderate spinal canal narrowing elsewhere. There is up  to moderate neural foraminal stenosis on the left at L3-L4 and  mild/moderate neural foraminal stenosis on the right at L4-L5. Lesser  degrees of neural foraminal narrowing seen elsewhere.    Mild presumed atelectasis versus scarring in the lung bases. Partially  visualized hiatal hernia. Scattered atherosclerotic calcifications of  the aortoiliac arteries. Please see separate report from CT chest and  CT abdomen and pelvis of same date for additional details regarding  extraspinal findings.      Impression    IMPRESSION:  1. No definite acute lumbar spine fracture.  2. Multilevel degenerative changes, as described.  3. Moderate dextroconvex curvature of the lumbar spine with mild/mild  to moderate multilevel degenerative spondylolisthesis.    RICHARD ROGEL MD         SYSTEM ID:  [id number]   CT Chest/Abdomen/Pelvis w  Contrast    Narrative    CT CHEST/ABDOMEN/PELVIS W CONTRAST 4/22/2024 12:45 PM    CLINICAL HISTORY: Trauma, abdominal pain, fall    TECHNIQUE: CT scan of the chest, abdomen, and pelvis was performed  following injection of IV contrast. Multiplanar reformats were  obtained. Dose reduction techniques were used.   CONTRAST: 50mL [id number]    COMPARISON: 10/16/2023, 7/13/2016    FINDINGS:   LUNGS AND PLEURA: No pneumothorax, consolidation or pleural effusion.  Mild groundglass opacities in the right lower lobe (series 4, image  [id number]), likely inflammatory. No significant pulmonary nodules or  masses. Few punctate calcified granuloma.    MEDIASTINUM/AXILLAE: No lymphadenopathy. No filling defects in the  main or lobar pulmonary arteries. No thoracic aortic aortic injury or  aneurysm. Moderate coronary artery calcifications. Small pericardial  effusion. Large hiatal hernia containing the upper half of the  stomach.    HEPATOBILIARY: No traumatic injury in the liver. No calcified  gallstones. Moderate dilatation of the extrahepatic bile ducts 13 mm  has progressed since 2016. Tapering of the duct towards the ampulla.  Mild intrahepatic biliary ductal dictation. Distended gallbladder,  new.    PANCREAS: Diffuse mild pancreatic parenchymal atrophy. No pancreatic  ductal dilatation or traumatic injury. Small cyst in the pancreatic  neck/body measuring 5 mm (3/161).    SPLEEN: Normal.    ADRENAL GLANDS: Normal.    KIDNEYS/BLADDER: No traumatic injury. Atrophic right kidney with an  obstructing calculus in the right mid ureter measuring 6 mm (series 3,  image 204), previously measuring 12 mm in 2016. Multiple other  nonobstructing calculi in the right kidney and upper ureter. Simple  cyst in the left kidney requiring specific follow-up, stable. No left  renal calculi or hydronephrosis. Distended urinary bladder.    BOWEL: No small bowel or colonic obstruction or inflammatory changes.  Colonic diverticulosis.    PELVIC  ORGANS: Hysterectomy    ADDITIONAL FINDINGS: No lymphadenopathy. No free fluid or fluid  collections. No free air.    MUSCULOSKELETAL: Degenerative changes of the right shoulder.  Thoracolumbar scoliosis. Degenerative changes in the thoracolumbar  spine. Sacral Tarlov cysts. Wedge compression fracture T8 is age  indeterminate, not previously included on the available comparisons.  Wedge compression fracture of T10 is stable since 2016.      Impression    IMPRESSION:  1.  Age-indeterminate fracture of T8 may be chronic however no  comparison imaging of the thoracic spine is available. Please  correlate for tenderness in this area and if clinically indicated, a  follow-up thoracic spine MRI can be considered.  2.  Otherwise, no definite acute traumatic injury in the chest,  abdomen and pelvis.  3.  Mild groundglass opacities in the right lower lobe may be  infectious or inflammatory.  4.  Mild gallbladder distention and moderate intrahepatic and extra  hepatic biliary ductal dilatation has progressed since 2016. No  obstructing calculi or masses by CT. Correlate with liver function  tests and outpatient MRCP or ERCP could be considered.  5.  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 A 6 mm  obstructing calculus in the mid right ureter previously measured 12  mm.   6.  Other incidental findings as discussed above.    SIMI SNIDER MD         SYSTEM ID:  [id number]       Discharge Medications   Current Discharge Medication List        CONTINUE these medications which have NOT CHANGED    Details   acetaminophen (TYLENOL) 500 MG tablet Take 1,000 mg by mouth 3 times daily      albuterol (PROAIR HFA, PROVENTIL HFA, VENTOLIN HFA) 108 (90 BASE) MCG/ACT inhaler Inhale 2 puffs into the lungs every 4 hours as needed Gets refilled once a year, never uses.      atorvastatin (LIPITOR) 40 MG tablet Take 40 mg by mouth At Bedtime      Cholecalciferol (VITAMIN D3 PO) Take 2,000 Units by mouth daily       diltiazem ER  (DILT-XR) 240 MG 24 hr ER beaded capsule Take 240 mg by mouth daily      multivitamin, therapeutic with minerals (MULTI-VITAMIN) TABS Take 1 tablet by mouth daily      omeprazole (PRILOSEC) 10 MG DR capsule Take 10 mg by mouth daily           STOP taking these medications       Abaloparatide 3120 MCG/1.56ML SOPN injection Comments:   Reason for Stopping:         calcium carbonate (OS-DEANA) 1500 (600 Ca) MG tablet Comments:   Reason for Stopping:             Allergies   Allergies   Allergen Reactions    Dust Mites     Latex Itching    Pollen Extract     Ampicillin Rash    Cefzil [Cefprozil] Rash     Red, itchy  Note: received Ancef 10/6/09 w/o problems    Doxycycline Nausea    Hydrocodone Other (See Comments)     Confusion    Oxycodone Itching      Not a true allergy    Penicillins Rash    Septra [Sulfamethoxazole-Trimethoprim] Rash

## 2024-04-25 NOTE — PLAN OF CARE
"Physical Therapy Discharge Summary    Reason for therapy discharge:    Discharged to home with home therapy.    Progress towards therapy goal(s). See goals on Care Plan in [facility] electronic health record for goal details.  Goals partially met.  Barriers to achieving goals:   discharge from facility.    Therapy recommendation(s):    Continued therapy is recommended.  Rationale/Recommendations:  Per prior PT recommendation, \"Pt currently below independent with walker baseline due to generalized weakness/frailty and impaired cognition. Progressed on this date to perform bed mob, transfers, and ambulation with SBA, FWW with gait and slow pace, but steady. If pt has up to CGA and walker with mobility, recommend returning to [facility] with  PT. If this level of assistance is not available, recommend TCF\".      "